# Patient Record
Sex: MALE | Race: BLACK OR AFRICAN AMERICAN | NOT HISPANIC OR LATINO | URBAN - METROPOLITAN AREA
[De-identification: names, ages, dates, MRNs, and addresses within clinical notes are randomized per-mention and may not be internally consistent; named-entity substitution may affect disease eponyms.]

---

## 2022-07-19 ENCOUNTER — INPATIENT (INPATIENT)
Facility: HOSPITAL | Age: 27
LOS: 2 days | Discharge: HOME | End: 2022-07-22
Attending: STUDENT IN AN ORGANIZED HEALTH CARE EDUCATION/TRAINING PROGRAM | Admitting: STUDENT IN AN ORGANIZED HEALTH CARE EDUCATION/TRAINING PROGRAM

## 2022-07-19 VITALS
RESPIRATION RATE: 16 BRPM | HEART RATE: 54 BPM | TEMPERATURE: 98 F | OXYGEN SATURATION: 100 % | DIASTOLIC BLOOD PRESSURE: 102 MMHG | SYSTOLIC BLOOD PRESSURE: 149 MMHG

## 2022-07-19 DIAGNOSIS — Z00.00 ENCOUNTER FOR GENERAL ADULT MEDICAL EXAMINATION WITHOUT ABNORMAL FINDINGS: ICD-10-CM

## 2022-07-19 DIAGNOSIS — I10 ESSENTIAL (PRIMARY) HYPERTENSION: ICD-10-CM

## 2022-07-19 DIAGNOSIS — E11.10 TYPE 2 DIABETES MELLITUS WITH KETOACIDOSIS WITHOUT COMA: ICD-10-CM

## 2022-07-19 DIAGNOSIS — E11.65 TYPE 2 DIABETES MELLITUS WITH HYPERGLYCEMIA: ICD-10-CM

## 2022-07-19 DIAGNOSIS — E11.9 TYPE 2 DIABETES MELLITUS WITHOUT COMPLICATIONS: ICD-10-CM

## 2022-07-19 DIAGNOSIS — K76.0 FATTY (CHANGE OF) LIVER, NOT ELSEWHERE CLASSIFIED: ICD-10-CM

## 2022-07-19 DIAGNOSIS — E87.1 HYPO-OSMOLALITY AND HYPONATREMIA: ICD-10-CM

## 2022-07-19 LAB
A1C WITH ESTIMATED AVERAGE GLUCOSE RESULT: 12.8 % — HIGH (ref 4–5.6)
ALBUMIN SERPL ELPH-MCNC: 4.8 G/DL — SIGNIFICANT CHANGE UP (ref 3.3–5)
ALP SERPL-CCNC: 83 U/L — SIGNIFICANT CHANGE UP (ref 40–120)
ALT FLD-CCNC: 79 U/L — HIGH (ref 4–41)
ANION GAP SERPL CALC-SCNC: 15 MMOL/L — HIGH (ref 7–14)
ANION GAP SERPL CALC-SCNC: 15 MMOL/L — HIGH (ref 7–14)
ANION GAP SERPL CALC-SCNC: 19 MMOL/L — HIGH (ref 7–14)
APPEARANCE UR: CLEAR — SIGNIFICANT CHANGE UP
AST SERPL-CCNC: 43 U/L — HIGH (ref 4–40)
B-OH-BUTYR SERPL-SCNC: 0.7 MMOL/L — HIGH (ref 0–0.4)
B-OH-BUTYR SERPL-SCNC: 1.1 MMOL/L — HIGH (ref 0–0.4)
BASOPHILS # BLD AUTO: 0.02 K/UL — SIGNIFICANT CHANGE UP (ref 0–0.2)
BASOPHILS NFR BLD AUTO: 0.4 % — SIGNIFICANT CHANGE UP (ref 0–2)
BILIRUB SERPL-MCNC: 1 MG/DL — SIGNIFICANT CHANGE UP (ref 0.2–1.2)
BILIRUB UR-MCNC: NEGATIVE — SIGNIFICANT CHANGE UP
BLOOD GAS VENOUS COMPREHENSIVE RESULT: SIGNIFICANT CHANGE UP
BLOOD GAS VENOUS COMPREHENSIVE RESULT: SIGNIFICANT CHANGE UP
BUN SERPL-MCNC: 13 MG/DL — SIGNIFICANT CHANGE UP (ref 7–23)
BUN SERPL-MCNC: 14 MG/DL — SIGNIFICANT CHANGE UP (ref 7–23)
BUN SERPL-MCNC: 15 MG/DL — SIGNIFICANT CHANGE UP (ref 7–23)
CALCIUM SERPL-MCNC: 10.3 MG/DL — SIGNIFICANT CHANGE UP (ref 8.4–10.5)
CALCIUM SERPL-MCNC: 9.8 MG/DL — SIGNIFICANT CHANGE UP (ref 8.4–10.5)
CALCIUM SERPL-MCNC: 9.9 MG/DL — SIGNIFICANT CHANGE UP (ref 8.4–10.5)
CHLORIDE SERPL-SCNC: 86 MMOL/L — LOW (ref 98–107)
CHLORIDE SERPL-SCNC: 92 MMOL/L — LOW (ref 98–107)
CHLORIDE SERPL-SCNC: 96 MMOL/L — LOW (ref 98–107)
CO2 SERPL-SCNC: 23 MMOL/L — SIGNIFICANT CHANGE UP (ref 22–31)
CO2 SERPL-SCNC: 25 MMOL/L — SIGNIFICANT CHANGE UP (ref 22–31)
CO2 SERPL-SCNC: 26 MMOL/L — SIGNIFICANT CHANGE UP (ref 22–31)
COLOR SPEC: SIGNIFICANT CHANGE UP
CREAT SERPL-MCNC: 1.13 MG/DL — SIGNIFICANT CHANGE UP (ref 0.5–1.3)
CREAT SERPL-MCNC: 1.17 MG/DL — SIGNIFICANT CHANGE UP (ref 0.5–1.3)
CREAT SERPL-MCNC: 1.35 MG/DL — HIGH (ref 0.5–1.3)
DIFF PNL FLD: NEGATIVE — SIGNIFICANT CHANGE UP
EGFR: 74 ML/MIN/1.73M2 — SIGNIFICANT CHANGE UP
EGFR: 88 ML/MIN/1.73M2 — SIGNIFICANT CHANGE UP
EGFR: 91 ML/MIN/1.73M2 — SIGNIFICANT CHANGE UP
EOSINOPHIL # BLD AUTO: 0.15 K/UL — SIGNIFICANT CHANGE UP (ref 0–0.5)
EOSINOPHIL NFR BLD AUTO: 2.9 % — SIGNIFICANT CHANGE UP (ref 0–6)
EPI CELLS # UR: 0 /HPF — SIGNIFICANT CHANGE UP (ref 0–5)
ESTIMATED AVERAGE GLUCOSE: 321 — SIGNIFICANT CHANGE UP
FLUAV AG NPH QL: SIGNIFICANT CHANGE UP
FLUBV AG NPH QL: SIGNIFICANT CHANGE UP
GAS PNL BLDV: SIGNIFICANT CHANGE UP
GLUCOSE SERPL-MCNC: 356 MG/DL — HIGH (ref 70–99)
GLUCOSE SERPL-MCNC: 489 MG/DL — CRITICAL HIGH (ref 70–99)
GLUCOSE SERPL-MCNC: 720 MG/DL — CRITICAL HIGH (ref 70–99)
GLUCOSE UR QL: ABNORMAL
HCT VFR BLD CALC: 46 % — SIGNIFICANT CHANGE UP (ref 39–50)
HGB BLD-MCNC: 16.3 G/DL — SIGNIFICANT CHANGE UP (ref 13–17)
IANC: 2.93 K/UL — SIGNIFICANT CHANGE UP (ref 1.8–7.4)
IMM GRANULOCYTES NFR BLD AUTO: 0.2 % — SIGNIFICANT CHANGE UP (ref 0–1.5)
KETONES UR-MCNC: ABNORMAL
LACTATE BLDV-MCNC: 2 MMOL/L — SIGNIFICANT CHANGE UP (ref 0.5–2)
LEUKOCYTE ESTERASE UR-ACNC: NEGATIVE — SIGNIFICANT CHANGE UP
LIDOCAIN IGE QN: 26 U/L — SIGNIFICANT CHANGE UP (ref 7–60)
LYMPHOCYTES # BLD AUTO: 1.52 K/UL — SIGNIFICANT CHANGE UP (ref 1–3.3)
LYMPHOCYTES # BLD AUTO: 29.3 % — SIGNIFICANT CHANGE UP (ref 13–44)
MAGNESIUM SERPL-MCNC: 2.4 MG/DL — SIGNIFICANT CHANGE UP (ref 1.6–2.6)
MAGNESIUM SERPL-MCNC: 2.5 MG/DL — SIGNIFICANT CHANGE UP (ref 1.6–2.6)
MCHC RBC-ENTMCNC: 28.8 PG — SIGNIFICANT CHANGE UP (ref 27–34)
MCHC RBC-ENTMCNC: 35.4 GM/DL — SIGNIFICANT CHANGE UP (ref 32–36)
MCV RBC AUTO: 81.4 FL — SIGNIFICANT CHANGE UP (ref 80–100)
MONOCYTES # BLD AUTO: 0.55 K/UL — SIGNIFICANT CHANGE UP (ref 0–0.9)
MONOCYTES NFR BLD AUTO: 10.6 % — SIGNIFICANT CHANGE UP (ref 2–14)
NEUTROPHILS # BLD AUTO: 2.93 K/UL — SIGNIFICANT CHANGE UP (ref 1.8–7.4)
NEUTROPHILS NFR BLD AUTO: 56.6 % — SIGNIFICANT CHANGE UP (ref 43–77)
NITRITE UR-MCNC: NEGATIVE — SIGNIFICANT CHANGE UP
NRBC # BLD: 0 /100 WBCS — SIGNIFICANT CHANGE UP
NRBC # FLD: 0 K/UL — SIGNIFICANT CHANGE UP
PH UR: 6 — SIGNIFICANT CHANGE UP (ref 5–8)
PHOSPHATE SERPL-MCNC: 4.1 MG/DL — SIGNIFICANT CHANGE UP (ref 2.5–4.5)
PHOSPHATE SERPL-MCNC: 4.6 MG/DL — HIGH (ref 2.5–4.5)
PLATELET # BLD AUTO: 336 K/UL — SIGNIFICANT CHANGE UP (ref 150–400)
POTASSIUM SERPL-MCNC: 4.1 MMOL/L — SIGNIFICANT CHANGE UP (ref 3.5–5.3)
POTASSIUM SERPL-MCNC: 4.5 MMOL/L — SIGNIFICANT CHANGE UP (ref 3.5–5.3)
POTASSIUM SERPL-MCNC: 5.2 MMOL/L — SIGNIFICANT CHANGE UP (ref 3.5–5.3)
POTASSIUM SERPL-SCNC: 4.1 MMOL/L — SIGNIFICANT CHANGE UP (ref 3.5–5.3)
POTASSIUM SERPL-SCNC: 4.5 MMOL/L — SIGNIFICANT CHANGE UP (ref 3.5–5.3)
POTASSIUM SERPL-SCNC: 5.2 MMOL/L — SIGNIFICANT CHANGE UP (ref 3.5–5.3)
PROT SERPL-MCNC: 8.7 G/DL — HIGH (ref 6–8.3)
PROT UR-MCNC: NEGATIVE — SIGNIFICANT CHANGE UP
RBC # BLD: 5.65 M/UL — SIGNIFICANT CHANGE UP (ref 4.2–5.8)
RBC # FLD: 13 % — SIGNIFICANT CHANGE UP (ref 10.3–14.5)
RBC CASTS # UR COMP ASSIST: 0 /HPF — SIGNIFICANT CHANGE UP (ref 0–4)
RSV RNA NPH QL NAA+NON-PROBE: SIGNIFICANT CHANGE UP
SARS-COV-2 RNA SPEC QL NAA+PROBE: SIGNIFICANT CHANGE UP
SODIUM SERPL-SCNC: 128 MMOL/L — LOW (ref 135–145)
SODIUM SERPL-SCNC: 132 MMOL/L — LOW (ref 135–145)
SODIUM SERPL-SCNC: 137 MMOL/L — SIGNIFICANT CHANGE UP (ref 135–145)
SP GR SPEC: 1.03 — SIGNIFICANT CHANGE UP (ref 1–1.05)
TROPONIN T, HIGH SENSITIVITY RESULT: 14 NG/L — SIGNIFICANT CHANGE UP
UROBILINOGEN FLD QL: SIGNIFICANT CHANGE UP
WBC # BLD: 5.18 K/UL — SIGNIFICANT CHANGE UP (ref 3.8–10.5)
WBC # FLD AUTO: 5.18 K/UL — SIGNIFICANT CHANGE UP (ref 3.8–10.5)
WBC UR QL: 1 /HPF — SIGNIFICANT CHANGE UP (ref 0–5)

## 2022-07-19 PROCEDURE — 99285 EMERGENCY DEPT VISIT HI MDM: CPT

## 2022-07-19 PROCEDURE — 76705 ECHO EXAM OF ABDOMEN: CPT | Mod: 26

## 2022-07-19 PROCEDURE — 99255 IP/OBS CONSLTJ NEW/EST HI 80: CPT

## 2022-07-19 PROCEDURE — 74177 CT ABD & PELVIS W/CONTRAST: CPT | Mod: 26,MA

## 2022-07-19 PROCEDURE — 99223 1ST HOSP IP/OBS HIGH 75: CPT | Mod: GC

## 2022-07-19 RX ORDER — SODIUM CHLORIDE 9 MG/ML
1000 INJECTION, SOLUTION INTRAVENOUS ONCE
Refills: 0 | Status: COMPLETED | OUTPATIENT
Start: 2022-07-19 | End: 2022-07-19

## 2022-07-19 RX ORDER — METOCLOPRAMIDE HCL 10 MG
10 TABLET ORAL ONCE
Refills: 0 | Status: COMPLETED | OUTPATIENT
Start: 2022-07-19 | End: 2022-07-19

## 2022-07-19 RX ORDER — DEXTROSE 50 % IN WATER 50 %
12.5 SYRINGE (ML) INTRAVENOUS ONCE
Refills: 0 | Status: DISCONTINUED | OUTPATIENT
Start: 2022-07-19 | End: 2022-07-22

## 2022-07-19 RX ORDER — INSULIN LISPRO 100/ML
4 VIAL (ML) SUBCUTANEOUS ONCE
Refills: 0 | Status: COMPLETED | OUTPATIENT
Start: 2022-07-19 | End: 2022-07-19

## 2022-07-19 RX ORDER — DEXTROSE 50 % IN WATER 50 %
25 SYRINGE (ML) INTRAVENOUS ONCE
Refills: 0 | Status: DISCONTINUED | OUTPATIENT
Start: 2022-07-19 | End: 2022-07-22

## 2022-07-19 RX ORDER — SODIUM CHLORIDE 9 MG/ML
1000 INJECTION, SOLUTION INTRAVENOUS
Refills: 0 | Status: DISCONTINUED | OUTPATIENT
Start: 2022-07-19 | End: 2022-07-22

## 2022-07-19 RX ORDER — INSULIN GLARGINE 100 [IU]/ML
23 INJECTION, SOLUTION SUBCUTANEOUS
Refills: 0 | Status: DISCONTINUED | OUTPATIENT
Start: 2022-07-20 | End: 2022-07-20

## 2022-07-19 RX ORDER — FAMOTIDINE 10 MG/ML
20 INJECTION INTRAVENOUS ONCE
Refills: 0 | Status: COMPLETED | OUTPATIENT
Start: 2022-07-19 | End: 2022-07-19

## 2022-07-19 RX ORDER — OMEPRAZOLE 10 MG/1
1 CAPSULE, DELAYED RELEASE ORAL
Qty: 0 | Refills: 0 | DISCHARGE

## 2022-07-19 RX ORDER — PANTOPRAZOLE SODIUM 20 MG/1
40 TABLET, DELAYED RELEASE ORAL
Refills: 0 | Status: DISCONTINUED | OUTPATIENT
Start: 2022-07-19 | End: 2022-07-22

## 2022-07-19 RX ORDER — INSULIN LISPRO 100/ML
VIAL (ML) SUBCUTANEOUS EVERY 4 HOURS
Refills: 0 | Status: DISCONTINUED | OUTPATIENT
Start: 2022-07-19 | End: 2022-07-20

## 2022-07-19 RX ORDER — GLUCAGON INJECTION, SOLUTION 0.5 MG/.1ML
1 INJECTION, SOLUTION SUBCUTANEOUS ONCE
Refills: 0 | Status: DISCONTINUED | OUTPATIENT
Start: 2022-07-19 | End: 2022-07-22

## 2022-07-19 RX ORDER — SODIUM CHLORIDE 9 MG/ML
1000 INJECTION, SOLUTION INTRAVENOUS
Refills: 0 | Status: DISCONTINUED | OUTPATIENT
Start: 2022-07-19 | End: 2022-07-20

## 2022-07-19 RX ORDER — INSULIN LISPRO 100/ML
6 VIAL (ML) SUBCUTANEOUS ONCE
Refills: 0 | Status: COMPLETED | OUTPATIENT
Start: 2022-07-19 | End: 2022-07-19

## 2022-07-19 RX ORDER — ACETAMINOPHEN 500 MG
1000 TABLET ORAL ONCE
Refills: 0 | Status: COMPLETED | OUTPATIENT
Start: 2022-07-19 | End: 2022-07-19

## 2022-07-19 RX ORDER — INSULIN GLARGINE 100 [IU]/ML
23 INJECTION, SOLUTION SUBCUTANEOUS ONCE
Refills: 0 | Status: COMPLETED | OUTPATIENT
Start: 2022-07-19 | End: 2022-07-19

## 2022-07-19 RX ORDER — DEXTROSE 50 % IN WATER 50 %
15 SYRINGE (ML) INTRAVENOUS ONCE
Refills: 0 | Status: DISCONTINUED | OUTPATIENT
Start: 2022-07-19 | End: 2022-07-22

## 2022-07-19 RX ADMIN — INSULIN GLARGINE 23 UNIT(S): 100 INJECTION, SOLUTION SUBCUTANEOUS at 13:16

## 2022-07-19 RX ADMIN — Medication 6: at 22:27

## 2022-07-19 RX ADMIN — Medication 30 MILLILITER(S): at 09:17

## 2022-07-19 RX ADMIN — SODIUM CHLORIDE 1000 MILLILITER(S): 9 INJECTION, SOLUTION INTRAVENOUS at 09:16

## 2022-07-19 RX ADMIN — Medication 6 UNIT(S): at 11:39

## 2022-07-19 RX ADMIN — Medication 8: at 18:45

## 2022-07-19 RX ADMIN — SODIUM CHLORIDE 125 MILLILITER(S): 9 INJECTION, SOLUTION INTRAVENOUS at 15:04

## 2022-07-19 RX ADMIN — Medication 4 UNIT(S): at 16:12

## 2022-07-19 RX ADMIN — SODIUM CHLORIDE 1000 MILLILITER(S): 9 INJECTION, SOLUTION INTRAVENOUS at 18:07

## 2022-07-19 RX ADMIN — FAMOTIDINE 20 MILLIGRAM(S): 10 INJECTION INTRAVENOUS at 09:17

## 2022-07-19 RX ADMIN — Medication 400 MILLIGRAM(S): at 09:17

## 2022-07-19 RX ADMIN — Medication 10 MILLIGRAM(S): at 09:16

## 2022-07-19 RX ADMIN — Medication 4 UNIT(S): at 15:04

## 2022-07-19 NOTE — ED ADULT NURSE REASSESSMENT NOTE - NS ED NURSE REASSESS COMMENT FT1
AAOx4, no signs of distress, continuing q1h fingersticks and insulin coverage, pending bed placement, fall precautions maintained

## 2022-07-19 NOTE — CONSULT NOTE ADULT - SUBJECTIVE AND OBJECTIVE BOX
HPI:      PAST MEDICAL & SURGICAL HISTORY:      FAMILY HISTORY:      Social History:        MEDICATIONS  (STANDING):  lactated ringers. 1000 milliLiter(s) (125 mL/Hr) IV Continuous <Continuous>    MEDICATIONS  (PRN):      Allergies    No Known Allergies    Intolerances      Review of Systems:  Constitutional: No fever  Eyes: No blurry vision  Neuro: No tremors  HEENT: No pain  Cardiovascular: No chest pain, palpitations  Respiratory: No SOB, no cough  GI: No nausea, vomiting, abdominal pain  : No dysuria  Skin: no rash  Psych: no depression  Endocrine: no polyuria, polydipsia  Hem/lymph: no swelling  Osteoporosis: no fractures    ALL OTHER SYSTEMS REVIEWED AND NEGATIVE    UNABLE TO OBTAIN    PHYSICAL EXAM:  VITALS: T(C): 36.6 (07-19-22 @ 16:00)  T(F): 97.9 (07-19-22 @ 16:00), Max: 97.9 (07-19-22 @ 16:00)  HR: 96 (07-19-22 @ 16:00) (54 - 96)  BP: 154/106 (07-19-22 @ 16:00) (149/102 - 154/106)  RR:  (14 - 16)  SpO2:  (98% - 100%)  Wt(kg): --  GENERAL: NAD, well-groomed, well-developed  EYES: No proptosis, no lid lag, anicteric  HEENT:  Atraumatic, Normocephalic, moist mucous membranes  THYROID: Normal size, no palpable nodules  RESPIRATORY: Clear to auscultation bilaterally; No rales, rhonchi, wheezing  CARDIOVASCULAR: Regular rate and rhythm; No murmurs; no peripheral edema  GI: Soft, nontender, non distended, normal bowel sounds  SKIN: Dry, intact, No rashes or lesions  MUSCULOSKELETAL: Full range of motion, normal strength  NEURO: sensation intact, extraocular movements intact, no tremor  PSYCH: Alert and oriented x 3, normal affect, normal mood  CUSHING'S SIGNS: no striae      CAPILLARY BLOOD GLUCOSE      POCT Blood Glucose.: 368 mg/dL (19 Jul 2022 17:08)  POCT Blood Glucose.: 389 mg/dL (19 Jul 2022 15:54)  POCT Blood Glucose.: 447 mg/dL (19 Jul 2022 14:22)  POCT Blood Glucose.: 439 mg/dL (19 Jul 2022 13:15)  POCT Blood Glucose.: 473 mg/dL (19 Jul 2022 12:33)  POCT Blood Glucose.: 512 mg/dL (19 Jul 2022 11:23)                            16.3   5.18  )-----------( 336      ( 19 Jul 2022 09:23 )             46.0       07-19    132<L>  |  92<L>  |  14  ----------------------------<  489<HH>  4.5   |  25  |  1.17    eGFR: 88    Ca    9.8      07-19  Mg     2.50     07-19  Phos  4.1     07-19    TPro  8.7<H>  /  Alb  4.8  /  TBili  1.0  /  DBili  x   /  AST  43<H>  /  ALT  79<H>  /  AlkPhos  83  07-19      Thyroid Function Tests:      A1C with Estimated Average Glucose Result: 12.8 % (07-19-22 @ 12:33)          Radiology:                HPI: 27M presents to ED with nausea, vomiting, abdominal pain reduced po intake x 2 weeks, found with glucose over 700, elevated anion gap to 19.  He also noted polyuria, polydipsia, mild blurry vision.  Mother and grandmother have DM2. He was never told of diabetes before, had not been following closely with a doctor.  Reports diet was poor high in juice, soda, carbs. + weight loss    PAST MEDICAL & SURGICAL HISTORY:    FAMILY HISTORY: Mother and grandmother DM2    Social History: he is uninsured    MEDICATIONS  (STANDING):  lactated ringers. 1000 milliLiter(s) (125 mL/Hr) IV Continuous <Continuous>    MEDICATIONS  (PRN):      Allergies    No Known Allergies    Intolerances      Review of Systems:  Constitutional: No fever  Eyes: +blurry vision  Neuro: No tremors  HEENT: No pain  Cardiovascular: No chest pain, palpitations  Respiratory: No SOB, no cough  GI: +nausea, vomiting, abdominal pain  : No dysuria  Skin: no rash  Psych: no depression  Endocrine: + polyuria, polydipsia  Hem/lymph: no swelling  Osteoporosis: no fractures    ALL OTHER SYSTEMS REVIEWED AND NEGATIVE      PHYSICAL EXAM:  VITALS: T(C): 36.6 (07-19-22 @ 16:00)  T(F): 97.9 (07-19-22 @ 16:00), Max: 97.9 (07-19-22 @ 16:00)  HR: 96 (07-19-22 @ 16:00) (54 - 96)  BP: 154/106 (07-19-22 @ 16:00) (149/102 - 154/106)  RR:  (14 - 16)  SpO2:  (98% - 100%)  Wt(kg): --  GENERAL: NAD, well-groomed, well-developed  EYES: No proptosis, no lid lag, anicteric  HEENT:  Atraumatic, Normocephalic, moist mucous membranes  THYROID: Normal size, no palpable nodules  RESPIRATORY: Clear to auscultation bilaterally; No rales, rhonchi, wheezing  CARDIOVASCULAR: Regular rate and rhythm; No murmurs; no peripheral edema  GI: Soft, nontender, non distended, normal bowel sounds  SKIN: Dry, intact, No rashes or lesions  MUSCULOSKELETAL: Full range of motion, normal strength  NEURO: sensation intact, extraocular movements intact, no tremor  PSYCH: Alert and oriented x 3, normal affect, normal mood  CUSHING'S SIGNS: no striae      CAPILLARY BLOOD GLUCOSE      POCT Blood Glucose.: 368 mg/dL (19 Jul 2022 17:08)  POCT Blood Glucose.: 389 mg/dL (19 Jul 2022 15:54)  POCT Blood Glucose.: 447 mg/dL (19 Jul 2022 14:22)  POCT Blood Glucose.: 439 mg/dL (19 Jul 2022 13:15)  POCT Blood Glucose.: 473 mg/dL (19 Jul 2022 12:33)  POCT Blood Glucose.: 512 mg/dL (19 Jul 2022 11:23)                            16.3   5.18  )-----------( 336      ( 19 Jul 2022 09:23 )             46.0       07-19    132<L>  |  92<L>  |  14  ----------------------------<  489<HH>  4.5   |  25  |  1.17    eGFR: 88    Ca    9.8      07-19  Mg     2.50     07-19  Phos  4.1     07-19    TPro  8.7<H>  /  Alb  4.8  /  TBili  1.0  /  DBili  x   /  AST  43<H>  /  ALT  79<H>  /  AlkPhos  83  07-19      Thyroid Function Tests:      A1C with Estimated Average Glucose Result: 12.8 % (07-19-22 @ 12:33)          Radiology:                HPI: 27M presents to ED with nausea, vomiting, abdominal pain reduced po intake x 2 weeks, found with glucose over 700, elevated anion gap to 19.  He also noted polyuria, polydipsia, mild blurry vision.  Mother and grandmother have DM2. He was never told of diabetes before, had not been following closely with a doctor.  Reports diet was poor high in juice, soda, carbs. + weight loss    PAST MEDICAL & SURGICAL HISTORY:    FAMILY HISTORY: Mother and grandmother DM2    Social History: he is uninsured    MEDICATIONS  (STANDING):  lactated ringers. 1000 milliLiter(s) (125 mL/Hr) IV Continuous <Continuous>    MEDICATIONS  (PRN):      Allergies    No Known Allergies    Intolerances      Review of Systems:  Constitutional: No fever  Eyes: +blurry vision  Neuro: No tremors  HEENT: No pain  Cardiovascular: No chest pain, palpitations  Respiratory: No SOB, no cough  GI: +nausea, vomiting, abdominal pain  : No dysuria  Skin: no rash  Psych: no depression  Endocrine: + polyuria, polydipsia  Hem/lymph: no swelling  Osteoporosis: no fractures    ALL OTHER SYSTEMS REVIEWED AND NEGATIVE      PHYSICAL EXAM:  VITALS: T(C): 36.6 (07-19-22 @ 16:00)  T(F): 97.9 (07-19-22 @ 16:00), Max: 97.9 (07-19-22 @ 16:00)  HR: 96 (07-19-22 @ 16:00) (54 - 96)  BP: 154/106 (07-19-22 @ 16:00) (149/102 - 154/106)  RR:  (14 - 16)  SpO2:  (98% - 100%)  Wt(kg): --  GENERAL: NAD, well-groomed, well-developed  EYES: No proptosis, no lid lag, anicteric  HEENT:  Atraumatic, Normocephalic, moist mucous membranes  THYROID: + thyromegaly, nontender  RESPIRATORY: Clear to auscultation bilaterally; No rales, rhonchi, wheezing  CARDIOVASCULAR: Regular rate and rhythm; No murmurs; no peripheral edema  GI: Soft, nontender, non distended, normal bowel sounds  SKIN: Dry, intact, No rashes or lesions  MUSCULOSKELETAL: Full range of motion, normal strength  NEURO: sensation intact, extraocular movements intact, no tremor  PSYCH: Alert and oriented x 3, normal affect, normal mood  CUSHING'S SIGNS: no striae      CAPILLARY BLOOD GLUCOSE      POCT Blood Glucose.: 368 mg/dL (19 Jul 2022 17:08)  POCT Blood Glucose.: 389 mg/dL (19 Jul 2022 15:54)  POCT Blood Glucose.: 447 mg/dL (19 Jul 2022 14:22)  POCT Blood Glucose.: 439 mg/dL (19 Jul 2022 13:15)  POCT Blood Glucose.: 473 mg/dL (19 Jul 2022 12:33)  POCT Blood Glucose.: 512 mg/dL (19 Jul 2022 11:23)                            16.3   5.18  )-----------( 336      ( 19 Jul 2022 09:23 )             46.0       07-19    132<L>  |  92<L>  |  14  ----------------------------<  489<HH>  4.5   |  25  |  1.17    eGFR: 88    Ca    9.8      07-19  Mg     2.50     07-19  Phos  4.1     07-19    TPro  8.7<H>  /  Alb  4.8  /  TBili  1.0  /  DBili  x   /  AST  43<H>  /  ALT  79<H>  /  AlkPhos  83  07-19      Thyroid Function Tests:      A1C with Estimated Average Glucose Result: 12.8 % (07-19-22 @ 12:33)          Radiology:

## 2022-07-19 NOTE — ED PROVIDER NOTE - NSICDXFAMILYHX_GEN_ALL_CORE_FT
FAMILY HISTORY:  Father  Still living? Yes, Estimated age: Age Unknown  Family history of other disorder of lipoprotein metabolism or other lipidemia, Age at diagnosis: Age Unknown    Mother  Still living? Yes, Estimated age: Age Unknown  Family history of diabetes mellitus (DM), Age at diagnosis: Age Unknown    Aunt  Still living? Yes, Estimated age: Age Unknown  Family history of diabetes mellitus (DM), Age at diagnosis: Age Unknown    Uncle  Still living? No  Family history of diabetes mellitus (DM), Age at diagnosis: Age Unknown

## 2022-07-19 NOTE — H&P ADULT - PROBLEM SELECTOR PLAN 2
- Newly diagnosed with A1C 12.8 and serum glucose 720, beta hydroxy mildly elevated to 1.2, serum lactate mildly elevated to 3.   - Etiology: multifactorial with strong family history of DM and HLD and heavy use of juice and soda since childhood. Have not seen PCP due to insurance issue since age of 17.   - IL IVF x1 given at the ED. Another IVF given on the floor due to hypovolemia from NPO and polyuria.  - Lantus 23 units given at the ED and started on moderate insulin sliding scale and FS Q4, will continue Lantus 23 units daily  - Will monitor his BMP, VBG, lactate, and beta hydroxy frequently  - Adjust insulin regimen based on labs - Newly diagnosed with A1C 12.8 and serum glucose 720, beta hydroxy mildly elevated to 1.2, serum lactate mildly elevated to 3. Potassium 5.3.  - Etiology: multifactorial with strong family history of DM and HLD and heavy use of juice and soda since childhood. Have not seen PCP due to insurance issue since age of 17.   - IL IVF x1 given at the ED. Another IVF given on the floor due to hypovolemia from NPO and polyuria.  - Lantus 23 units given at the ED and started on moderate insulin sliding scale and FS Q4, will continue Lantus 23 units daily  - Will monitor his BMP, VBG, lactate, and beta hydroxy frequently  - Adjust insulin regimen based on labs  - Follow endo recs to look into type:      check MICHAEL, islet cell and zinc transporter ab     check c-peptide as outpatient when glucose toxicity is reduced - Newly diagnosed with A1C 12.8 and serum glucose 720, beta hydroxy mildly elevated to 1.2, serum lactate mildly elevated to 3. Potassium 5.3.  - Etiology: multifactorial with strong family history of DM and HLD and heavy use of juice and soda since childhood. Have not seen PCP due to insurance issue since age of 17.   - IL IVF x1 given at the ED. Another IVF given on the floor due to hypovolemia from NPO and polyuria.  - Lantus 23 units given at the ED and started on moderate insulin sliding scale and FS Q4, will continue Lantus 23 units daily  - Will monitor his BMP, VBG, lactate, and beta hydroxy frequently  - FS glucose steadily improving down to 300s.   - Adjust insulin regimen based on labs  - Follow endo recs to look into type:      check MICHAEL, islet cell and zinc transporter ab     check c-peptide as outpatient when glucose toxicity is reduced

## 2022-07-19 NOTE — H&P ADULT - NSHPLABSRESULTS_GEN_ALL_CORE
16.3   5.18  )-----------( 336      ( 19 Jul 2022 09:23 )             46.0   07-19    132<L>  |  92<L>  |  14  ----------------------------<  489<HH>  4.5   |  25  |  1.17    Ca    9.8      19 Jul 2022 12:33  Phos  4.1     07-19  Mg     2.50     07-19    TPro  8.7<H>  /  Alb  4.8  /  TBili  1.0  /  DBili  x   /  AST  43<H>  /  ALT  79<H>  /  AlkPhos  83  07-19    < from: US Abdomen Upper Quadrant Right (07.19.22 @ 10:24) >    ACC: [id number] EXAM:  US ABDOMEN RT UPR QUADRANT                          PROCEDURE DATE:  07/19/2022          INTERPRETATION:  CLINICAL INFORMATION: Epigastric and right upper   quadrant pain, vomiting    COMPARISON: None available.    TECHNIQUE: Sonography of the right upper quadrant.    FINDINGS:  Liver: Enlarged. Increased echogenicity with attenuation of the posterior   liver. Smooth surface contour.  Bile ducts: Normal caliber. Common bile duct measures 3 mm.  Gallbladder: Within normal limits. No wall thickening or pericholecystic   edema. No visualized gallstones.  Pancreas: Not visualized.  Right kidney: 11.7 cm. No hydronephrosis.  Ascites: None.  IVC: Visualized portions are within normal limits.    IMPRESSION:  Hepatomegaly with hepatic steatosis.    Sonographically normal gallbladder. 16.3   5.18  )-----------( 336      ( 19 Jul 2022 09:23 )             46.0   07-19    132<L>  |  92<L>  |  14  ----------------------------<  489<HH>  4.5   |  25  |  1.17    Ca    9.8      19 Jul 2022 12:33  Phos  4.1     07-19  Mg     2.50     07-19    TPro  8.7<H>  /  Alb  4.8  /  TBili  1.0  /  DBili  x   /  AST  43<H>  /  ALT  79<H>  /  AlkPhos  83  07-19    Beta Hydroxy-Butyrate (07.19.22 @ 13:02)    Beta Hydroxy-Butyrate: 1.1 mmol/L  Beta Hydroxy-Butyrate (07.19.22 @ 09:23)    Beta Hydroxy-Butyrate: 1.2 mmol/L    POCT  Blood Glucose (07.19.22 @ 18:45)    POCT Blood Glucose.: 341 mg/dL      < from: US Abdomen Upper Quadrant Right (07.19.22 @ 10:24) >    ACC: [id number] EXAM:  US ABDOMEN RT UPR QUADRANT                          PROCEDURE DATE:  07/19/2022          INTERPRETATION:  CLINICAL INFORMATION: Epigastric and right upper   quadrant pain, vomiting    COMPARISON: None available.    TECHNIQUE: Sonography of the right upper quadrant.    FINDINGS:  Liver: Enlarged. Increased echogenicity with attenuation of the posterior   liver. Smooth surface contour.  Bile ducts: Normal caliber. Common bile duct measures 3 mm.  Gallbladder: Within normal limits. No wall thickening or pericholecystic   edema. No visualized gallstones.  Pancreas: Not visualized.  Right kidney: 11.7 cm. No hydronephrosis.  Ascites: None.  IVC: Visualized portions are within normal limits.    IMPRESSION:  Hepatomegaly with hepatic steatosis.    Sonographically normal gallbladder.

## 2022-07-19 NOTE — H&P ADULT - PROBLEM SELECTOR PLAN 4
- Hepatomegaly with steatosis noted in the RUQ US  - Mild elevation of AST/ALT 43/79  <2 , normal ALK, Bilirubin  - Dx: fatty liver disease, alcohol use, medications  - Will trend lab and follow up in the outpatient

## 2022-07-19 NOTE — ED PROVIDER NOTE - PHYSICAL EXAMINATION
VS:  unremarkable    GEN - mild distress abd pain; malaise  A+O x3   HEAD - NC/AT     ENT - PEERL, EOMI, mucous membranes  dry , no discharge      NECK: Neck supple, non-tender without lymphadenopathy, no masses, no JVD  PULM - CTA b/l,  symmetric breath sounds  COR -  normal heart sounds    ABD - , ND, epig and RUQ abd ttp, RLQ ttp, soft,  BACK - no CVA tenderness, nontender spine     EXTREMS - no edema, no deformity, warm and well perfused    SKIN - no rash    or bruising      NEUROLOGIC - alert, face symmetric, speech fluent, sensation nl, motor no focal deficit.

## 2022-07-19 NOTE — H&P ADULT - HISTORY OF PRESENT ILLNESS
27 year old with no known past medical history presented to the ED for progressive worsening nausea and vomiting and poor oral intake for the past two weeks. Pt initially can not tolerate solid food and kept drinking juice for being thirsty. He endorsed also significant amount of urination and 30 pound weight loss over the past two weeks. In addition, he stated feeling abdominal pain on his right side in the past two days.  He denied any fever, chill, SOB, heart racing, and no sick contacts as far as he recollected. He stated that he has been heavy drinker of juice and soda (whole jars per day) since he was little kid. He has not seen PCP due to insurance issue since he was 17 and not aware of medical history. He reported that he has a strong family history of diabetes from his mother side and cholesterol issue from his father.  27 year old with no known past medical history presented to the ED for progressive worsening nausea and vomiting and poor oral intake for the past two weeks. Pt initially can not tolerate solid food and kept drinking juice for being thirsty. He endorsed also significant amount of urination and 30 pound weight loss over the past two weeks. In addition, he stated feeling abdominal pain on his right side in the past two days.  He denied any fever, chill, SOB, heart racing, and no sick contacts as far as he recollected. He stated that he has been heavy drinker of juice and soda (whole jars per day) since he was little kid. He has not seen PCP due to insurance issue since he was 17 and not aware of medical history. He reported that he has a strong family history of diabetes from his mother side and cholesterol issue from his father.     At the ED, he was given IVF 1L and lantus 23 units, amelog 10 units on a moderate insulin sliding scale (received 8 units). Vital sign stable, nausea/vomiting minimal.

## 2022-07-19 NOTE — H&P ADULT - ATTENDING COMMENTS
Patient seen and examined at bedside. In brief, 27 year old man with history of GERD, presenting with nausea, vomiting, and inability to tolerate PO for around two weeks.  Pt found to be hyperglycemic >700, with AG of 17, A1c of 12.8, consistent with HHS vs. DKA given AG, however, elevate FS, and low beta hydroxybuterate and new diabetes   #New DM A1c 12.8   - Started on weight based dosing Lantus 23 units at noon with moderate scale q 4hrs   - s/p IVF bolus, on maintaince fluids; add D5 to fluids once FS < 250s  - F/U lipid panel   - Needs insulin teaching on discharge   - Endocrine consult, appreciate recs   #APOLONIA - patient's creatine on admission was 1.35 likely pre-renal   - Continue IVF   - CTM   #GERD  - Continue PPI  #Elevated blood pressure  - If blood pressure remains elevated will start anti-hypertensive   Rest of plan as above

## 2022-07-19 NOTE — H&P ADULT - NSHPREVIEWOFSYSTEMS_GEN_ALL_CORE
REVIEW OF SYSTEMS:    CONSTITUTIONAL: No weakness, fevers or chills, obesity noted.  EYES: No visual changes; no sclera icterics, no pain or drainage  ENT:  No vertigo or throat pain   NECK: No pain or stiffness  RESPIRATORY: No cough, wheezing, hemoptysis; No shortness of breath  CARDIOVASCULAR: No chest pain or palpitations  GASTROINTESTINAL: + nausea, vomiting, or hematemesis; No diarrhea or constipation. No melena or hematochezia. Mild right lateral abdominal /flank pain.   GENITOURINARY: No dysuria, hematuria. increased urinary output.   NEUROLOGICAL: No numbness or weakness. No headache  SKIN: No itching,  Psych: No anxiety or depression

## 2022-07-19 NOTE — ED PROVIDER NOTE - PROGRESS NOTE DETAILS
DD ED ATTG:  feeling a bit better.  Noted to have glucose> 700 on serum, AG 19, pH 7.33.  Mild DKA.  VS acceptable.  Will try to close gap with SQ insulin, fluids, recheck BMP and VBG, will also send a1c.  Pt reports DM runs in his family, has been drinking a lot of juice, has been having weight loss. Still awaiting CT.

## 2022-07-19 NOTE — H&P ADULT - NSHPPHYSICALEXAM_GEN_ALL_CORE
VITALS:   T(C): 36.6 (07-19-22 @ 16:00), Max: 36.6 (07-19-22 @ 08:27)  HR: 96 (07-19-22 @ 16:00) (54 - 96)  BP: 154/106 (07-19-22 @ 16:00) (149/102 - 154/106)  RR: 14 (07-19-22 @ 16:00) (14 - 16)  SpO2: 98% (07-19-22 @ 16:00) (98% - 100%)    GENERAL: NAD, sitting on bed comfortably, obese  HEAD:  Atraumatic, Normocephalic  EYES: EOMI, PERRLA, conjunctiva and sclera clear  ENT: dry mucous membranes  NECK: Supple, No JVD  CHEST/LUNG: CTABL; No rales, rhonchi, wheezing, or rubs. Unlabored respirations  HEART: RRR. No M/R/G  ABDOMEN: Soft, normoactive BS. No hepatomegaly. Mild tenderness to deep palpation in his right lateral abdomen.   EXTREMITIES:  2+ Peripheral Pulses, brisk capillary refill. No clubbing, cyanosis, or edema  NERVOUS SYSTEM:  Alert & Oriented X3, speech clear. No deficits, CN II-XII intact. Normal sensation   MSK: FROM all 4 extremities, full and equal strength  PSYCH: Normal affect, normal speech, normal behavior  SKIN: Acanthosis nigricans noted on his neck .

## 2022-07-19 NOTE — CONSULT NOTE ADULT - ASSESSMENT
27M new onset DM presenting with severe hyperglycemia to 700s, mild DKA    1) DKA  anion gap 19, then improved to 15  BHB 1.2 then 1.1  ph 7.34  Glucose 700s initially now 300s  Discussed with ED attending earlier - Lantus 23 units recommended, given at 1:16PM  At time of visit patient reports symptomatic improvement  NPO  Moderate Admelog scale q4h, FS q4h  IV fluids, once glucose < 250 add dextrose to IV fluids until gap is closed and patient is eating  DKA labs q6h until gap resolved     2) New DM - ketosis prone DM2 vs rule out DM1  clarify type of DM  check MICHAEL, islet cell and zinc transporter ab  check c-peptide as outpatient when glucose toxicity is reduced  HBA1c 12.8%  For now maintain Lantus 23 units q daily at 1pm, will reassess trend to determine if Lantus dose should be changed by mid day tomorrow.  Once DKA resolved and on a carb consistent diet can recommend Admelog 6 units premeal TID, low Admelog scale premeal and low bedtime scale    Discharge plan:  Reviewed importance of dietary modifications  order RD consult as well  uninsured  will need glucometer teaching, insulin admin teaching either vial and/or pen depending on final dc plan  discussed with transitions of care pharmacist  may consider using coupons for short term supply of basal bolus insulin vs. Novolin 70/30 either vial from [employer] or pens from [employer].  To discuss further with patient tomorrow.  [place] [facility] [place] [address] [facility], [phone]    3) HTN  BP goal < 130/80  elevated on admission  would trend and consider need for therapy    Complex patient high level decision making.    Addison Gabriel MD  Division of Endocrinology  Pager: [phone]    If after 6PM or before 9AM, or on weekends/holidays, please call endocrine answering service for assistance ([phone]).  For nonurgent matters email [email or web address] for assistance.   27M new onset DM presenting with severe hyperglycemia to 700s, mild DKA    1) DKA  anion gap 19, then improved to 15  BHB 1.2 then 1.1  ph 7.34  Glucose 700s initially now 300s  Discussed with ED attending earlier - Lantus 23 units recommended, given at 1:16PM  At time of visit patient reports symptomatic improvement  NPO  Moderate Admelog scale q4h, FS q4h  IV fluids, once glucose < 250 add dextrose to IV fluids until gap is closed and patient is eating  DKA labs q6h until gap resolved     2) New DM - ketosis prone DM2 vs rule out DM1  clarify type of DM  check MICHAEL, islet cell and zinc transporter ab  check c-peptide as outpatient when glucose toxicity is reduced  HBA1c 12.8%  For now maintain Lantus 23 units q daily at 1pm, will reassess trend to determine if Lantus dose should be changed by mid day tomorrow.  Once DKA resolved and on a carb consistent diet can recommend Admelog 6 units premeal TID, low Admelog scale premeal and low bedtime scale    Discharge plan:  Reviewed importance of dietary modifications  order RD consult as well  uninsured  will need glucometer teaching, insulin admin teaching either vial and/or pen depending on final dc plan  discussed with transitions of care pharmacist  may consider using coupons for short term supply of basal bolus insulin vs. Novolin 70/30 either vial from [employer] or pens from [employer].  To discuss further with patient tomorrow.  [place] [facility] [place] [address] [facility], [phone]    3) HTN  BP goal < 130/80  elevated on admission  would trend and consider need for therapy    4) Thyromegaly, nontender  check TSH, Free T4 in AM    Complex patient high level decision making.    Addison Gabriel MD  Division of Endocrinology  Pager: [phone]    If after 6PM or before 9AM, or on weekends/holidays, please call endocrine answering service for assistance ([phone]).  For nonurgent matters email [email or web address] for assistance.

## 2022-07-19 NOTE — H&P ADULT - PROBLEM SELECTOR PLAN 1
Newly diagnosed with A1C 12.8 and serum glucose 720, beta hydroxy mildly elevated to 1.2, serum lactate mildly elevated to 3. Etiology: multifactorial with strong family history of DM and HLD and heavy use of juice and soda since childhood.  Have not seen PCP due to insurance issue since age of 17.   - IL IVF x1 given at the ED. Another IVF given on the floor due to hypovolemia from NPO and polyuria.  - Lantus 23 units given at the ED and started on moderate insulin sliding scale and FS Q4   - Will monitor - Serum glucose level initially presented at the level of 700s, > 500 mg/dl  - Increased effective plasma osmolality >320 mOsm/kg in the absence of ketoacidosis (minimal beta hydroxy)  - The - Serum glucose level initially presented at the level of 700s, > 500 mg/dl  - Increased effective plasma osmolality >320 mOsm/kg (321)in the absence of ketoacidosis (minimal beta hydroxy, normal PH although anion gap 19)  - The goal to treat HHS is to fluid resuscitation, electrolytes replenishment and glucose control   - Please refer insulin regimen and fluid/electrolytes resuscitation described below

## 2022-07-19 NOTE — ED PROVIDER NOTE - CLINICAL SUMMARY MEDICAL DECISION MAKING FREE TEXT BOX
27M not feeling well x 2 weeks,  not shawn PO, vomiting.  Also had nosebleed yesterday.  Today no vomiting.  Yesterday vomited x 1, day before x 1.  Hasn't sought medical care as of yet.  Tried sleeping and rest w/o improvement.  Having diffuse abd pain and low abd pain.  No pain at present, mainly p vomiting.  Not nauseous at present.  Never happened before.  Initially there was diarrhea but not anymore.  No abx use.  No travel.  GF has a cold.  PMHX GERD meds omeprazole  PSHX - none.  No T.  (+)MJ (not for last 4 weeks). NKA.  No dysuria, no blood in urine.  Epig, RUQ and RLQ ttp.  poss joseph vs appx vs colitis vs AGE, r/o covid, check labs CT, rx pain meds, fluids.  Pt will need pMD follow up if no acute finding.

## 2022-07-19 NOTE — ED PROVIDER NOTE - CARE PLAN
Principal Discharge DX:	Uncontrolled diabetes mellitus with hyperglycemia   1 Principal Discharge DX:	Uncontrolled diabetes mellitus with hyperglycemia  Secondary Diagnosis:	Frequent PVCs

## 2022-07-19 NOTE — ED ADULT NURSE NOTE - OBJECTIVE STATEMENT
Patient is a 28 yo male, h/x GERD, presenting with nausea, vomiting, abdominal and back pain x 2 weeks. AAOx4, no signs of distress, reports nausea and vomiting 1-3x/day after eating for the past 2 weeks, with abdominal and back pain after he vomits. Currently denying abdominal pain but abdomen is tender to right side, soft, nondistended. Denies chest pain, shortness of breath, fever, chills, urinary symptoms, constipation, diarrhea. 20G PIV placed to RAC, labs sent per orders. Pending CT/ultrasound. Fall precautions maintained.

## 2022-07-19 NOTE — H&P ADULT - PROBLEM SELECTOR PLAN 5
- Ordered TSH, lipid panel to look into metabolic syndrome   - NO DVT prophylaxis is indicated as pt is able to ambulate  - NPO now

## 2022-07-19 NOTE — ED ADULT NURSE REASSESSMENT NOTE - NS ED NURSE REASSESS COMMENT FT1
Patient returned to intake, AAOx4, no signs of distress, discussed high BG, insulin administered, pending repeat labs, fall precautions maintained

## 2022-07-19 NOTE — PATIENT PROFILE ADULT - FALL HARM RISK - UNIVERSAL INTERVENTIONS
Bed in lowest position, wheels locked, appropriate side rails in place/Call bell, personal items and telephone in reach/Instruct patient to call for assistance before getting out of bed or chair/Non-slip footwear when patient is out of bed/[place] to call system/Physically safe environment - no spills, clutter or unnecessary equipment/Purposeful Proactive Rounding/Room/bathroom lighting operational, light cord in reach

## 2022-07-19 NOTE — ED PROVIDER NOTE - OBJECTIVE STATEMENT
27M not feeling well x 2 weeks,  not shawn PO, vomiting.  Also had nosebleed yesterday.  Today no vomiting.  Yesterday vomited x 1, day before x 1.  Hasn't sought medical care as of yet.  Tried sleeping and rest w/o improvement.  Having diffuse abd pain and low abd pain.  No pain at present, mainly p vomiting.  Not nauseous at present.  Never happened before.  Initially there was diarrhea but not anymore.  No abx use.  No travel.  GF has a cold.  PMHX GERD meds omeprazole  PSHX - none.  No T.  (+)MJ (not for last 4 weeks). NKA.  No dysuria, no blood in urine.  Epig, RUQ and RLQ ttp.  poss joseph vs appx vs colitis vs AGE, r/o covid, check labs CT, rx pain meds, fluids.  Pt will need pMD follow up if no acute finding.    VS:  unremarkable    GEN - mild distress abd pain; malaise  A+O x3   HEAD - NC/AT     ENT - PEERL, EOMI, mucous membranes  dry , no discharge      NECK: Neck supple, non-tender without lymphadenopathy, no masses, no JVD  PULM - CTA b/l,  symmetric breath sounds  COR -  normal heart sounds    ABD - , ND, epig and RUQ abd ttp, RLQ ttp, soft,  BACK - no CVA tenderness, nontender spine     EXTREMS - no edema, no deformity, warm and well perfused    SKIN - no rash    or bruising      NEUROLOGIC - alert, face symmetric, speech fluent, sensation nl, motor no focal deficit.

## 2022-07-19 NOTE — H&P ADULT - NSHPSOCIALHISTORY_GEN_ALL_CORE
Currently unemployed ,Living with his mother and girlfriend in [place]. Do not smoke, drink beers socially every couple of weeks, admit marijuana use (the most recent one was 4 weeks ago), denied other recreational drug usage.

## 2022-07-19 NOTE — H&P ADULT - ASSESSMENT
27 year old male with significant family history of DM2 and HLD, no known past medical history was admitted for newly diagnosed diabetes melitis in the setting of 2 week course of nausea/vomiting, polydipsia/polyuria as well as significant weight loss.

## 2022-07-20 ENCOUNTER — TRANSCRIPTION ENCOUNTER (OUTPATIENT)
Age: 27
End: 2022-07-20

## 2022-07-20 DIAGNOSIS — E88.81 METABOLIC SYNDROME AND OTHER INSULIN RESISTANCE: ICD-10-CM

## 2022-07-20 LAB
ANION GAP SERPL CALC-SCNC: 14 MMOL/L — SIGNIFICANT CHANGE UP (ref 7–14)
B-OH-BUTYR SERPL-SCNC: 0.7 MMOL/L — HIGH (ref 0–0.4)
BUN SERPL-MCNC: 12 MG/DL — SIGNIFICANT CHANGE UP (ref 7–23)
CALCIUM SERPL-MCNC: 9.6 MG/DL — SIGNIFICANT CHANGE UP (ref 8.4–10.5)
CHLORIDE SERPL-SCNC: 96 MMOL/L — LOW (ref 98–107)
CHOLEST SERPL-MCNC: 224 MG/DL — HIGH
CO2 SERPL-SCNC: 27 MMOL/L — SIGNIFICANT CHANGE UP (ref 22–31)
CREAT SERPL-MCNC: 1.16 MG/DL — SIGNIFICANT CHANGE UP (ref 0.5–1.3)
EGFR: 89 ML/MIN/1.73M2 — SIGNIFICANT CHANGE UP
GLUCOSE BLDC GLUCOMTR-MCNC: 227 MG/DL — HIGH (ref 70–99)
GLUCOSE BLDC GLUCOMTR-MCNC: 249 MG/DL — HIGH (ref 70–99)
GLUCOSE BLDC GLUCOMTR-MCNC: 286 MG/DL — HIGH (ref 70–99)
GLUCOSE BLDC GLUCOMTR-MCNC: 293 MG/DL — HIGH (ref 70–99)
GLUCOSE BLDC GLUCOMTR-MCNC: 307 MG/DL — HIGH (ref 70–99)
GLUCOSE SERPL-MCNC: 279 MG/DL — HIGH (ref 70–99)
HDLC SERPL-MCNC: 24 MG/DL — LOW
LIPID PNL WITH DIRECT LDL SERPL: 171 MG/DL — HIGH
MAGNESIUM SERPL-MCNC: 2.1 MG/DL — SIGNIFICANT CHANGE UP (ref 1.6–2.6)
NON HDL CHOLESTEROL: 200 MG/DL — HIGH
PHOSPHATE SERPL-MCNC: 4.4 MG/DL — SIGNIFICANT CHANGE UP (ref 2.5–4.5)
POTASSIUM SERPL-MCNC: 4.3 MMOL/L — SIGNIFICANT CHANGE UP (ref 3.5–5.3)
POTASSIUM SERPL-SCNC: 4.3 MMOL/L — SIGNIFICANT CHANGE UP (ref 3.5–5.3)
SODIUM SERPL-SCNC: 137 MMOL/L — SIGNIFICANT CHANGE UP (ref 135–145)
T4 FREE SERPL-MCNC: 1.5 NG/DL — SIGNIFICANT CHANGE UP (ref 0.9–1.8)
TRIGL SERPL-MCNC: 146 MG/DL — SIGNIFICANT CHANGE UP
TSH SERPL-MCNC: 0.76 UIU/ML — SIGNIFICANT CHANGE UP (ref 0.27–4.2)

## 2022-07-20 PROCEDURE — 99232 SBSQ HOSP IP/OBS MODERATE 35: CPT

## 2022-07-20 PROCEDURE — 99233 SBSQ HOSP IP/OBS HIGH 50: CPT | Mod: GC

## 2022-07-20 RX ORDER — SODIUM CHLORIDE 9 MG/ML
1000 INJECTION, SOLUTION INTRAVENOUS
Refills: 0 | Status: DISCONTINUED | OUTPATIENT
Start: 2022-07-20 | End: 2022-07-20

## 2022-07-20 RX ORDER — INSULIN GLARGINE 100 [IU]/ML
32 INJECTION, SOLUTION SUBCUTANEOUS
Refills: 0 | Status: DISCONTINUED | OUTPATIENT
Start: 2022-07-20 | End: 2022-07-20

## 2022-07-20 RX ORDER — INSULIN GLARGINE 100 [IU]/ML
26 INJECTION, SOLUTION SUBCUTANEOUS
Refills: 0 | Status: DISCONTINUED | OUTPATIENT
Start: 2022-07-20 | End: 2022-07-20

## 2022-07-20 RX ORDER — INSULIN GLARGINE 100 [IU]/ML
30 INJECTION, SOLUTION SUBCUTANEOUS
Refills: 0 | Status: DISCONTINUED | OUTPATIENT
Start: 2022-07-20 | End: 2022-07-21

## 2022-07-20 RX ORDER — INSULIN LISPRO 100/ML
VIAL (ML) SUBCUTANEOUS
Refills: 0 | Status: DISCONTINUED | OUTPATIENT
Start: 2022-07-20 | End: 2022-07-22

## 2022-07-20 RX ORDER — INSULIN LISPRO 100/ML
VIAL (ML) SUBCUTANEOUS AT BEDTIME
Refills: 0 | Status: DISCONTINUED | OUTPATIENT
Start: 2022-07-20 | End: 2022-07-22

## 2022-07-20 RX ORDER — INSULIN LISPRO 100/ML
8 VIAL (ML) SUBCUTANEOUS
Refills: 0 | Status: DISCONTINUED | OUTPATIENT
Start: 2022-07-20 | End: 2022-07-21

## 2022-07-20 RX ORDER — ATORVASTATIN CALCIUM 80 MG/1
40 TABLET, FILM COATED ORAL AT BEDTIME
Refills: 0 | Status: DISCONTINUED | OUTPATIENT
Start: 2022-07-20 | End: 2022-07-22

## 2022-07-20 RX ADMIN — SODIUM CHLORIDE 125 MILLILITER(S): 9 INJECTION, SOLUTION INTRAVENOUS at 02:59

## 2022-07-20 RX ADMIN — Medication 2: at 18:21

## 2022-07-20 RX ADMIN — SODIUM CHLORIDE 125 MILLILITER(S): 9 INJECTION, SOLUTION INTRAVENOUS at 10:26

## 2022-07-20 RX ADMIN — INSULIN GLARGINE 30 UNIT(S): 100 INJECTION, SOLUTION SUBCUTANEOUS at 14:47

## 2022-07-20 RX ADMIN — Medication 4: at 02:28

## 2022-07-20 RX ADMIN — Medication 4: at 10:30

## 2022-07-20 RX ADMIN — Medication 8 UNIT(S): at 18:20

## 2022-07-20 RX ADMIN — Medication 1 TABLET(S): at 18:20

## 2022-07-20 RX ADMIN — Medication 6: at 06:03

## 2022-07-20 RX ADMIN — PANTOPRAZOLE SODIUM 40 MILLIGRAM(S): 20 TABLET, DELAYED RELEASE ORAL at 06:10

## 2022-07-20 RX ADMIN — ATORVASTATIN CALCIUM 40 MILLIGRAM(S): 80 TABLET, FILM COATED ORAL at 22:04

## 2022-07-20 RX ADMIN — Medication 8 UNIT(S): at 13:22

## 2022-07-20 RX ADMIN — Medication 1: at 22:03

## 2022-07-20 RX ADMIN — Medication 3: at 13:21

## 2022-07-20 NOTE — DISCHARGE NOTE PROVIDER - HOSPITAL COURSE
Mr. Brush is a 26 year old gentleman with significant family history of DM2 and HLD, no known past medical history was admitted for newly diagnosed diabetes mellitus A1c 12.8 and HSS/DKA in the setting of 2 week course of nausea/vomiting, polydipsia/polyuria as well as significant weight loss. During three day course of hospitalization, he received IVF, basal bolus insulin as well as insulin sliding scale and frequent FS glucose monitoring, per endocrine team recommendations. His anion gap is back to normal and ketone production is minimal after frequent monitoring of metabolic laboratory tests.    Mr. Brush is a 26 year old gentleman with significant family history of DM2 and HLD, no known past medical history was admitted for newly diagnosed diabetes mellitus A1c 12.8 and HSS/DKA in the setting of 2 week course of nausea/vomiting, polydipsia/polyuria as well as significant weight loss. During three day course of hospitalization, he received IVF, basal bolus insulin as well as insulin sliding scale and frequent FS glucose and lab monitoring, per endocrine team recommendations. His anion gap is back to normal and ketone production is minimal now. He is able to tolerate well with food with well controlled blood glucose. The test results for typing DM are still pending and will be discussed in the outpatient follow-ups. Pt is medically stable and ready to be discharged with optimized insulin regimen ??????? and supplies. Bedside instruction on insulin injection and daily glucose monitoring were delivered to patient as well as dietary education on healthy eating and lifestyle. Please follow up with endocrine at the [facility] for long term management of DM.     During hospitalization, pt is also found to have elevated blood pressure and lipids. He was started on lipitor 40 mg daily and lisinopril 10mg daily to prevent cardiovascular complications. Please follow up with PCP in the outpatient setting for the long term management of conditions.    Mr. Brush is a 26 year old gentleman with significant family history of DM2 and HLD, no known past medical history was admitted for newly diagnosed diabetes mellitus A1c 12.8 and HSS/DKA in the setting of 2 week course of nausea/vomiting, polydipsia/polyuria as well as significant weight loss. During three day course of hospitalization, he received IVF, basal bolus insulin as well as insulin sliding scale and frequent FS glucose and lab monitoring, per endocrine team recommendations. His anion gap is back to normal and ketone production is minimal now. He is able to tolerate well with food with well controlled blood glucose. His C-peptide level is 2.2 WNL, favoring type II DM and the diagnosis will be discussed in the outpatient follow-ups. Pt is medically stable and ready to be discharged with optimized insulin regimen ??????? and supplies. Bedside instruction on insulin injection and daily glucose monitoring were delivered to patient as well as dietary education on healthy eating and lifestyle. Please follow up with endocrine at the [facility] for long term management of DM.     During hospitalization, pt is also found to have elevated blood pressure and lipids. He was started on lipitor 40 mg daily and lisinopril 10mg daily to prevent cardiovascular complications. Please follow up with PCP in the outpatient setting for the long term management of conditions.    Mr. Brush is a 26 year old gentleman with significant family history of DM2 and HLD, no known past medical history was admitted for newly diagnosed diabetes mellitus A1c 12.8 and HSS/DKA in the setting of 2 week course of nausea/vomiting, polydipsia/polyuria as well as significant weight loss. During three day course of hospitalization, he received IVF, basal bolus insulin as well as insulin sliding scale and frequent FS glucose and lab monitoring, per endocrine team recommendations. His anion gap is back to normal and ketone production is minimal now. He is able to tolerate well with food with well controlled blood glucose. His C-peptide level is 2.2 WNL, favoring type II DM and the diagnosis will be discussed in the outpatient follow-ups. Pt is medically stable and ready to be discharged with optimized insulin regimen (Lantus Solostar pen 36 units daily after dinner, 12 units pre-meal TID (hold if not eating) ) and supplies. Bedside instruction on insulin injection and daily glucose monitoring were delivered to patient as well as dietary education on healthy eating and lifestyle. Please follow up with endocrine appointment in one week at the [facility] for long term management of DM.     During hospitalization, pt is also found to have elevated blood pressure and lipids. He was started on lipitor 40 mg daily and lisinopril 10mg daily to prevent cardiovascular complications. Please follow up with PCP in the outpatient setting for the long term management of conditions.    Mr. Brush is a 26 year old gentleman with significant family history of DM2 and HLD, no known past medical history was admitted for newly diagnosed diabetes mellitus A1c 12.8 and HSS/DKA in the setting of 2 week course of nausea/vomiting, polydipsia/polyuria as well as significant weight loss. During three day course of hospitalization, he received IVF, basal bolus insulin as well as insulin sliding scale and frequent FS glucose and lab monitoring, per endocrine team recommendations. His anion gap is back to normal and ketone production is minimal now. He is able to tolerate well with food with well controlled blood glucose. His C-peptide level is 2.2 WNL, favoring type II DM and the diagnosis will be discussed in the outpatient follow-ups. Pt is medically stable and ready to be discharged with optimized insulin regimen (Lantus 36 units daily after dinner, lispro 12 units pre-meal TID (hold if not eating) ) and supplies. Bedside instruction on insulin injection and daily glucose monitoring were delivered to patient as well as dietary education on healthy eating and lifestyle. Please follow up with endocrine appointment in one week at the [facility] for long term management of DM.     During hospitalization, pt is also found to have elevated blood pressure and lipids. He was started on lipitor 40 mg daily and lisinopril 10mg daily to prevent cardiovascular complications. Please follow up with PCP in the outpatient setting for the long term management of conditions.

## 2022-07-20 NOTE — DIETITIAN INITIAL EVALUATION ADULT - OTHER INFO
Regular diet na Per chart, 27 year old male with significant family history of DM2 and HLD, no known past medical history was admitted for newly diagnosed diabetes melitis in the setting of 2 week course of nausea/vomiting, polydipsia/polyuria as well as significant weight loss.     Nutrition interview: No recent episodes of nausea, vomiting, diarrhea or constipation, BM noted x3d ago per Pt. Pt believes this is attributed to not eating well prior to admission. Denies any chewing/swallowing difficulties. No food allergies. Stated UBW: ~240-250#, in line with current wt. Food preferences explored and noted. Pt remains NPO at this time.     RD provided pt with handout Carbohydrate Counting for People with Diabetes. Discussed carbohydrate sources, carbohydrate portions, protein sources, mixed meals, and nutrition label reading. Stressed importance of balanced meals with adequate protein and fiber. Pt was informed of current A1c, goal A1c, and goal fingerstick range.

## 2022-07-20 NOTE — PROGRESS NOTE ADULT - ASSESSMENT
27M new onset DM presenting with severe hyperglycemia to 700s, mild DKA    1) DKA  resolved.  AG closed.  BHB trending down  Now on diet    2) New DM - ketosis prone DM2 vs rule out DM1  clarify type of DM  check MICHAEL, islet cell and zinc transporter ab  check c-peptide as outpatient when glucose toxicity is reduced  HBA1c 12.8%  Increased lantus to 30 units q 1pm- 1st dose today  agree with Admelog 8 units tid ac- hold if npo/skip smeal  OFollow up RD consult  Target bg 100-180 mg/dl  Target a1c < 7%  BEDSIDE RN to education patient on how to use glucometer, administer insulin with vial and syringe and hypoglycemia s/s and management    Discharge plan:  Reviewed importance of dietary modifications  uninsured  will need glucometer teaching, insulin admin teaching either vial and/or pen depending on final dc plan  Referred to transitions of care pharmacist  may consider using coupons for short term supply of basal bolus insulin vs. Novolin 70/30 either vial from [employer] or pens from [employer].  To discuss further with patient tomorrow.  [place] [facility] [place] [address] [facility], [phone]    3) HTN  BP goal < 130/80  elevated on admission  would trend and consider need for therapy    4) HLD  ldl target , 70  Recommend starting moderate intensity statin    5) Thyromegaly, nontender  TSH, Ft4 within target range      Complex patient high level decision making.        Maria Luisa Gray  Nurse Practitioner  Division of Endocrinology & Diabetes  Pager # [phone]      If after 6PM or before 9AM, or on weekends/holidays, please call endocrine answering service for assistance ([phone]).  For nonurgent matters email [email or web address] for assistance.

## 2022-07-20 NOTE — PROGRESS NOTE ADULT - PROBLEM SELECTOR PLAN 3
- sodium 128 admitted, serum glucose 720  - Corrected sodium 138  - Will trend lab AM - low level HDL, HTN, DM  - Strong family history of DM and HLD  - Start atorvastatin 40mg   - May start ace-i tomorrow

## 2022-07-20 NOTE — PROGRESS NOTE ADULT - SUBJECTIVE AND OBJECTIVE BOX
PROGRESS NOTE:     Patient is a 27y old  Male who presents with a chief complaint of nausea/vomiting (2022 17:54)      SUBJECTIVE / OVERNIGHT EVENTS:  NAEON   ADDITIONAL REVIEW OF SYSTEMS:    MEDICATIONS  (STANDING):  dextrose 5% + lactated ringers. 1000 milliLiter(s) (125 mL/Hr) IV Continuous <Continuous>  dextrose 5%. 1000 milliLiter(s) (100 mL/Hr) IV Continuous <Continuous>  dextrose 5%. 1000 milliLiter(s) (50 mL/Hr) IV Continuous <Continuous>  dextrose 50% Injectable 25 Gram(s) IV Push once  dextrose 50% Injectable 12.5 Gram(s) IV Push once  dextrose 50% Injectable 25 Gram(s) IV Push once  glucagon  Injectable 1 milliGRAM(s) IntraMuscular once  insulin glargine Injectable (LANTUS) 23 Unit(s) SubCutaneous <User Schedule>  insulin lispro (ADMELOG) corrective regimen sliding scale   SubCutaneous every 4 hours  lactated ringers. 1000 milliLiter(s) (125 mL/Hr) IV Continuous <Continuous>  pantoprazole    Tablet 40 milliGRAM(s) Oral before breakfast    MEDICATIONS  (PRN):  dextrose Oral Gel 15 Gram(s) Oral once PRN Blood Glucose LESS THAN 70 milliGRAM(s)/deciliter      CAPILLARY BLOOD GLUCOSE      POCT Blood Glucose.: 288 mg/dL (2022 06:02)  POCT Blood Glucose.: 233 mg/dL (2022 02:15)  POCT Blood Glucose.: 264 mg/dL (2022 22:20)  POCT Blood Glucose.: 341 mg/dL (2022 18:45)  POCT Blood Glucose.: 368 mg/dL (2022 17:08)  POCT Blood Glucose.: 389 mg/dL (2022 15:54)  POCT Blood Glucose.: 447 mg/dL (2022 14:22)  POCT Blood Glucose.: 439 mg/dL (2022 13:15)  POCT Blood Glucose.: 473 mg/dL (2022 12:33)  POCT Blood Glucose.: 512 mg/dL (2022 11:23)    I&O's Summary      PHYSICAL EXAM:  Vital Signs Last 24 Hrs  T(C): 36.7 (2022 05:30), Max: 36.7 (2022 22:03)  T(F): 98.1 (2022 05:30), Max: 98.1 (2022 05:30)  HR: 86 (2022 05:30) (54 - 96)  BP: 135/95 (2022 05:30) (135/95 - 154/106)  BP(mean): --  RR: 16 (2022 05:30) (14 - 16)  SpO2: 100% (2022 05:30) (98% - 100%)    Parameters below as of 2022 05:30  Patient On (Oxygen Delivery Method): room air        GENERAL: No acute distress, well-developed  HEAD:  Atraumatic, Normocephalic  EYES: EOMI, PERRLA, conjunctiva and sclera clear  NECK: Supple, no lymphadenopathy, no JVD  CHEST/LUNG: CTAB; No wheezes, rales, or rhonchi  HEART: Regular rate and rhythm; No murmurs, rubs, or gallops  ABDOMEN: Soft, non-tender, non-distended; normal bowel sounds, no organomegaly  EXTREMITIES:  2+ peripheral pulses b/l, No clubbing, cyanosis, or edema  NEUROLOGY: A&O x 3, no focal deficits  SKIN: No rashes or lesions    LABS:                        16.3   5.18  )-----------( 336      ( 2022 09:23 )             46.0     07-19    137  |  96<L>  |  13  ----------------------------<  356<H>  4.1   |  26  |  1.13    Ca    9.9      2022 18:27  Phos  4.6     07-19  Mg     2.40     07-19    TPro  8.7<H>  /  Alb  4.8  /  TBili  1.0  /  DBili  x   /  AST  43<H>  /  ALT  79<H>  /  AlkPhos  83  07-19          Urinalysis Basic - ( 2022 09:23 )    Color: Light Yellow / Appearance: Clear / S.029 / pH: x  Gluc: x / Ketone: Small  / Bili: Negative / Urobili: <2 mg/dL   Blood: x / Protein: Negative / Nitrite: Negative   Leuk Esterase: Negative / RBC: 0 /HPF / WBC 1 /HPF   Sq Epi: x / Non Sq Epi: 0 /HPF / Bacteria: x          RADIOLOGY & ADDITIONAL TESTS:  Results Reviewed:   Imaging Personally Reviewed:  Electrocardiogram Personally Reviewed:    COORDINATION OF CARE:  Care Discussed with Consultants/Other Providers [Y/N]:  Prior or Outpatient Records Reviewed [Y/N]:   PROGRESS NOTE:     Patient is a 27y old  Male who presents with a chief complaint of nausea/vomiting (2022 17:54)      SUBJECTIVE / OVERNIGHT EVENTS:  NAEON. Clinically improving, denied nausea, vomiting, abdominal pain    ADDITIONAL REVIEW OF SYSTEMS:    MEDICATIONS  (STANDING):  dextrose 5% + lactated ringers. 1000 milliLiter(s) (125 mL/Hr) IV Continuous <Continuous>  dextrose 5%. 1000 milliLiter(s) (100 mL/Hr) IV Continuous <Continuous>  dextrose 5%. 1000 milliLiter(s) (50 mL/Hr) IV Continuous <Continuous>  dextrose 50% Injectable 25 Gram(s) IV Push once  dextrose 50% Injectable 12.5 Gram(s) IV Push once  dextrose 50% Injectable 25 Gram(s) IV Push once  glucagon  Injectable 1 milliGRAM(s) IntraMuscular once  insulin glargine Injectable (LANTUS) 23 Unit(s) SubCutaneous <User Schedule>  insulin lispro (ADMELOG) corrective regimen sliding scale   SubCutaneous every 4 hours  lactated ringers. 1000 milliLiter(s) (125 mL/Hr) IV Continuous <Continuous>  pantoprazole    Tablet 40 milliGRAM(s) Oral before breakfast    MEDICATIONS  (PRN):  dextrose Oral Gel 15 Gram(s) Oral once PRN Blood Glucose LESS THAN 70 milliGRAM(s)/deciliter      CAPILLARY BLOOD GLUCOSE      POCT Blood Glucose.: 288 mg/dL (2022 06:02)  POCT Blood Glucose.: 233 mg/dL (2022 02:15)  POCT Blood Glucose.: 264 mg/dL (2022 22:20)  POCT Blood Glucose.: 341 mg/dL (2022 18:45)  POCT Blood Glucose.: 368 mg/dL (2022 17:08)  POCT Blood Glucose.: 389 mg/dL (2022 15:54)  POCT Blood Glucose.: 447 mg/dL (2022 14:22)  POCT Blood Glucose.: 439 mg/dL (2022 13:15)  POCT Blood Glucose.: 473 mg/dL (2022 12:33)  POCT Blood Glucose.: 512 mg/dL (2022 11:23)    I&O's Summary      PHYSICAL EXAM:  Vital Signs Last 24 Hrs  T(C): 36.7 (2022 05:30), Max: 36.7 (2022 22:03)  T(F): 98.1 (2022 05:30), Max: 98.1 (2022 05:30)  HR: 86 (2022 05:30) (54 - 96)  BP: 135/95 (2022 05:30) (135/95 - 154/106)  BP(mean): --  RR: 16 (2022 05:30) (14 - 16)  SpO2: 100% (2022 05:30) (98% - 100%)    Parameters below as of 2022 05:30  Patient On (Oxygen Delivery Method): room air        GENERAL: No acute distress, well-developed  HEAD:  Atraumatic, Normocephalic  EYES: EOMI, PERRLA, conjunctiva and sclera clear  NECK: Supple, no lymphadenopathy, no JVD  CHEST/LUNG: CTAB; No wheezes, rales, or rhonchi  HEART: Regular rate and rhythm; No murmurs, rubs, or gallops  ABDOMEN: Soft, non-tender, non-distended; normal bowel sounds, hepatomegaly palpated  EXTREMITIES:  2+ peripheral pulses b/l, No clubbing, cyanosis, or edema  NEUROLOGY: A&O x 3, no focal deficits  SKIN: No rashes or lesions    LABS:                        16.3   5.18  )-----------( 336      ( 2022 09:23 )             46.0     07-19    137  |  96<L>  |  13  ----------------------------<  356<H>  4.1   |  26  |  1.13    Ca    9.9      2022 18:27  Phos  4.6     07-19  Mg     2.40     07-19    TPro  8.7<H>  /  Alb  4.8  /  TBili  1.0  /  DBili  x   /  AST  43<H>  /  ALT  79<H>  /  AlkPhos  83  07-19          Urinalysis Basic - ( 2022 09:23 )    Color: Light Yellow / Appearance: Clear / S.029 / pH: x  Gluc: x / Ketone: Small  / Bili: Negative / Urobili: <2 mg/dL   Blood: x / Protein: Negative / Nitrite: Negative   Leuk Esterase: Negative / RBC: 0 /HPF / WBC 1 /HPF   Sq Epi: x / Non Sq Epi: 0 /HPF / Bacteria: x          RADIOLOGY & ADDITIONAL TESTS:  Results Reviewed:   Imaging Personally Reviewed:  Electrocardiogram Personally Reviewed:    COORDINATION OF CARE:  Care Discussed with Consultants/Other Providers [Y/N]:  Prior or Outpatient Records Reviewed [Y/N]:

## 2022-07-20 NOTE — DISCHARGE NOTE PROVIDER - NSDCMRMEDTOKEN_GEN_ALL_CORE_FT
omeprazole 20 mg oral delayed release tablet: 1 tab(s) orally once a day   alcohol swabs : Apply topically to affected area 4 times a day   atorvastatin 40 mg oral tablet: 1 tab(s) orally once a day (at bedtime)  glucometer (per patient&#x27;s insurance): Test blood sugars four times a day. Dispense #1 glucometer.  lisinopril 10 mg oral tablet: 1 tab(s) orally once a day  Multiple Vitamins oral tablet: 1 tab(s) orally once a day  omeprazole 20 mg oral delayed release tablet: 1 tab(s) orally once a day  test strips (per patient&#x27;s insurance): 1 application subcutaneously 4 times a day. ** Compatible with patient&#x27;s glucometer **   alcohol swabs : Apply topically to affected area 4 times a day   atorvastatin 40 mg oral tablet: 1 tab(s) orally once a day (at bedtime)  glucometer (per patient&#x27;s insurance): Test blood sugars four times a day. Dispense #1 glucometer.  Insulin Pen Needles, 4mm: 1 application subcutaneously 4 times a day. ** Use with insulin pen **   lancets: 1 application subcutaneously 4 times a day   lisinopril 10 mg oral tablet: 1 tab(s) orally once a day  Multiple Vitamins oral tablet: 1 tab(s) orally once a day  omeprazole 20 mg oral delayed release tablet: 1 tab(s) orally once a day  test strips (per patient&#x27;s insurance): 1 application subcutaneously 4 times a day. ** Compatible with patient&#x27;s glucometer **   alcohol swabs : Apply topically to affected area 4 times a day   atorvastatin 40 mg oral tablet: 1 tab(s) orally once a day (at bedtime)  glucometer (per patient&#x27;s insurance): Test blood sugars four times a day. Dispense #1 glucometer.  HumaLOG KwikPen 100 units/mL injectable solution: 12 unit(s) injectable 3 times a day (before meals)   Insulin Pen Needles, 4mm: 1 application subcutaneously 4 times a day. ** Use with insulin pen **   lancets: 1 application subcutaneously 4 times a day   Lantus Solostar Pen 100 units/mL subcutaneous solution: 36  subcutaneous once a day (at bedtime)   lisinopril 10 mg oral tablet: 1 tab(s) orally once a day  Multiple Vitamins oral tablet: 1 tab(s) orally once a day  omeprazole 20 mg oral delayed release tablet: 1 tab(s) orally once a day  test strips (per patient&#x27;s insurance): 1 application subcutaneously 4 times a day. ** Compatible with patient&#x27;s glucometer **

## 2022-07-20 NOTE — DIETITIAN INITIAL EVALUATION ADULT - NS FNS DIET ORDER
Diet, Consistent Carbohydrate w/Evening Snack:   DASH/TLC {Sodium & Cholesterol Restricted} (DASH) (07-20-22 @ 11:05)

## 2022-07-20 NOTE — PROGRESS NOTE ADULT - PROBLEM SELECTOR PLAN 4
- Hepatomegaly with steatosis noted in the RUQ US  - Mild elevation of AST/ALT 43/79  <2 , normal ALK, Bilirubin  - Dx: fatty liver disease, alcohol use, medications  - Will trend lab and follow up in the outpatient - sodium 128 admitted, serum glucose 720  - Corrected sodium 138  - Will trend lab AM

## 2022-07-20 NOTE — DISCHARGE NOTE PROVIDER - NSDCFUADDAPPT_GEN_ALL_CORE_FT
An appointment to establish care with a primary care provider has been scheduled for you at the above clinic for 7/27/22 at 1:30 PM. Please call [phone] if you are unable to keep this appointment.    Please schedule to make an appointment with the endocrinology clinic at the same location and the same phone number as above. An appointment to establish care with a primary care provider has been scheduled for you at the above clinic for 7/27/22 at 1:30 PM. Please call [phone] if you are unable to keep this appointment.    And your appointment with the endocrinology clinic is scheduled on 11/08/22 at 9:20am at the same location and the same phone number as above.

## 2022-07-20 NOTE — PROGRESS NOTE ADULT - PROBLEM SELECTOR PLAN 2
- Newly diagnosed with A1C 12.8 and serum glucose 720, beta hydroxy mildly elevated to 1.2, serum lactate mildly elevated to 3. Potassium 5.3.  - Etiology: multifactorial with strong family history of DM and HLD and heavy use of juice and soda since childhood. Have not seen PCP due to insurance issue since age of 17.   - IL IVF x1 given at the ED. Another IVF given on the floor due to hypovolemia from NPO and polyuria.  - Lantus 23 units given at the ED and started on moderate insulin sliding scale and FS Q4, will continue Lantus 23 units daily  - Will monitor his BMP, VBG, lactate, and beta hydroxy frequently  - FS glucose steadily improving down to 300s.   - Adjust insulin regimen based on labs  - Follow endo recs to look into type:      check MICHAEL, islet cell and zinc transporter ab     check c-peptide as outpatient when glucose toxicity is reduced - Newly diagnosed with A1C 12.8 and serum glucose 720, beta hydroxy mildly elevated to 1.2, serum lactate mildly elevated to 3. Potassium 5.3.  - Etiology: multifactorial with strong family history of DM and HLD and heavy use of juice and soda since childhood. Have not seen PCP due to insurance issue since age of 17.   - IL IVF x1 given at the ED. Another IVF given on the floor due to hypovolemia from NPO and polyuria.  - Lantus 23 units given at the ED and started on moderate insulin sliding scale and FS Q4, will continue Lantus 23 units daily  - Will monitor his BMP, VBG, lactate, and beta hydroxy frequently  - FS glucose steadily improving down to 300s.   - Adjust insulin regimen based on labs  - Follow endo recs to look into type:      check MICHAEL, islet cell and zinc transporter ab     check c-peptide as outpatient when glucose toxicity is reduced  - AG < 15, BHB 0.7 <1, pt is started on DASH diet, D5LR DCed 07/20  - Currently on lantus 30 units daily, amelog 8 units TID premeal, low dose sliding scale 07/20

## 2022-07-20 NOTE — DIETITIAN INITIAL EVALUATION ADULT - ORAL INTAKE PTA/DIET HISTORY
Pt lives at home with his mom and his girlfriend. He states he mostly orders in foods ([employer], [employer], [employer], Deli, Pizza, Chinese). Pt states that sometimes his girlfriend will cook. No specific diet followed. No supplements/vitamins taken PTA. Pt states he had poor PO intake x2 weeks PTA 2/2 nausea, vomiting and stomach pain- would only drink juice.

## 2022-07-20 NOTE — DIETITIAN INITIAL EVALUATION ADULT - PERTINENT MEDS FT
MEDICATIONS  (STANDING):  atorvastatin 40 milliGRAM(s) Oral at bedtime  dextrose 5%. 1000 milliLiter(s) (100 mL/Hr) IV Continuous <Continuous>  dextrose 5%. 1000 milliLiter(s) (50 mL/Hr) IV Continuous <Continuous>  dextrose 50% Injectable 25 Gram(s) IV Push once  dextrose 50% Injectable 12.5 Gram(s) IV Push once  dextrose 50% Injectable 25 Gram(s) IV Push once  glucagon  Injectable 1 milliGRAM(s) IntraMuscular once  insulin glargine Injectable (LANTUS) 30 Unit(s) SubCutaneous <User Schedule>  insulin lispro (ADMELOG) corrective regimen sliding scale   SubCutaneous three times a day before meals  insulin lispro (ADMELOG) corrective regimen sliding scale   SubCutaneous at bedtime  insulin lispro Injectable (ADMELOG) 8 Unit(s) SubCutaneous three times a day before meals  lactated ringers. 1000 milliLiter(s) (125 mL/Hr) IV Continuous <Continuous>  pantoprazole    Tablet 40 milliGRAM(s) Oral before breakfast    MEDICATIONS  (PRN):  dextrose Oral Gel 15 Gram(s) Oral once PRN Blood Glucose LESS THAN 70 milliGRAM(s)/deciliter

## 2022-07-20 NOTE — PROGRESS NOTE ADULT - PROBLEM SELECTOR PLAN 5
- Ordered TSH, lipid panel to look into metabolic syndrome   - NO DVT prophylaxis is indicated as pt is able to ambulate  - NPO now - Hepatomegaly with steatosis noted in the RUQ US  - Mild elevation of AST/ALT 43/79  <2 , normal ALK, Bilirubin  - Dx: fatty liver disease, alcohol use, medications  - Will trend lab and follow up in the outpatient

## 2022-07-20 NOTE — DISCHARGE NOTE PROVIDER - NSFOLLOWUPCLINICS_GEN_ALL_ED_FT
[facility] [place]  Internal Medicine  [address] [place] [address]  Phone: [phone]  Fax: [phone]  Scheduled Appointment: 7/27/2022 1:30 PM

## 2022-07-20 NOTE — DIETITIAN INITIAL EVALUATION ADULT - PERTINENT LABORATORY DATA
07-20 Na 137 mmol/L Glu 279 mg/dL<H> K+ 4.3 mmol/L Cr 1.16 mg/dL BUN 12 mg/dL Phos 4.4 mg/dL  07-20 @ 14:47 POCT 307 mg/dL  07-20 @ 12:24 POCT 286 mg/dL  07-20 @ 10:29 POCT 249 mg/dL  07-20 @ 06:02 POCT 288 mg/dL  07-20 @ 02:15 POCT 233 mg/dL  07-19 @ 22:20 POCT 264 mg/dL  07-19 @ 18:45 POCT 341 mg/dL  07-19 @ 17:08 POCT 368 mg/dL    POCT Blood Glucose.: 307 mg/dL (07-20-22 @ 14:47)  A1C with Estimated Average Glucose Result: 12.8 % (07-19-22 @ 12:33)

## 2022-07-20 NOTE — DISCHARGE NOTE PROVIDER - NSDCCPCAREPLAN_GEN_ALL_CORE_FT
PRINCIPAL DISCHARGE DIAGNOSIS  Diagnosis: Uncontrolled diabetes mellitus with hyperglycemia  Assessment and Plan of Treatment: You were admitted to hospital for elevated blood glucose and later found to be diagnosed with diabetes mellitus. The level of your blood glucose is critically high that could be life threatening. We treated you with fluids and insulin and monitored your blood glucose, electrolytes, acidity and metabolites frequently. Now your blood glucose is well controlled with an optimized insulin regimen ??   . Education on diet, glucometer, and insulin usage were also delivered to you at the bed side. Please make sure you check you blood glucose regularly everyday and adhere to your insulin injection per instruction. Pleaese follow up with endocrine doctors in 1 week after being discharged.    If you developed feelings of nausea, vomitng, acute adominal pain, and/ or your glucose is extremely high > 300, please come back to ED.      SECONDARY DISCHARGE DIAGNOSES  Diagnosis: Frequent PVCs  Assessment and Plan of Treatment:     Diagnosis: HTN (hypertension)  Assessment and Plan of Treatment: Your bloood pressure is elevated, higher than optimal range. Because  you also have diabetes, we started blood pressure medication lisinopril 10mg daily to prevent kidney damage from diabetes. Please monitor your blood pressure regularly at home after discharge and follow up with your primary care doctor for long term management of hypertension.    Diagnosis: HLD (hyperlipidemia)  Assessment and Plan of Treatment: Your blood cholesterol is elevated, higher than optimal range.  We started lipitor 40mg daily to prevent its damage to your heart and blood vessles. Please follow up with your primary care doctor for long term management of hyperlipidemia.     PRINCIPAL DISCHARGE DIAGNOSIS  Diagnosis: Uncontrolled diabetes mellitus with hyperglycemia  Assessment and Plan of Treatment: You were admitted to hospital for elevated blood glucose and later found to be diagnosed with diabetes mellitus. The level of your blood glucose is critically high that could be life threatening. We treated you with fluids and insulin and monitored your blood glucose, electrolytes, acidity and metabolites frequently. Now your blood glucose is well controlled with an optimized insulin regimen ??   . Education on diet, glucometer, and insulin usage were also delivered to you at the bed side. Please make sure you check you blood glucose regularly everyday and adhere to your insulin injection per instruction. Pleaese follow up with endocrine doctors in 1 week after being discharged.    If you developed feelings of nausea, vomitng, acute adominal pain, and your glucose is extremely high > 250, please come back to ED.      SECONDARY DISCHARGE DIAGNOSES  Diagnosis: Frequent PVCs  Assessment and Plan of Treatment:     Diagnosis: HTN (hypertension)  Assessment and Plan of Treatment: Your bloood pressure is elevated, higher than optimal range. Because  you also have diabetes, we started blood pressure medication lisinopril 10mg daily to prevent kidney damage from diabetes. Please monitor your blood pressure regularly at home after discharge and follow up with your primary care doctor for long term management of hypertension.    Diagnosis: HLD (hyperlipidemia)  Assessment and Plan of Treatment: Your blood cholesterol is elevated, higher than optimal range.  We started lipitor 40mg daily to prevent its damage to your heart and blood vessles. Please follow up with your primary care doctor for long term management of hyperlipidemia.     PRINCIPAL DISCHARGE DIAGNOSIS  Diagnosis: Uncontrolled diabetes mellitus with hyperglycemia  Assessment and Plan of Treatment: You were admitted to hospital for elevated blood glucose and later found to be diagnosed with diabetes mellitus. The level of your blood glucose is critically high that could be life threatening. We treated you with fluids and insulin and monitored your blood glucose, electrolytes, acidity and metabolites frequently. Now your blood glucose is well controlled with an optimized insulin regimen ??   . Education on diet, glucometer, and insulin usage were also delivered to you at the bed side. Please make sure you check you blood glucose regularly everyday and adhere to your insulin injection per instruction. Pleaese follow up with endocrine doctors in 1 week after being discharged.    If you developed feelings of nausea, vomitng, acute adominal pain, and your glucose is extremely high > 250, please come back to ED.      SECONDARY DISCHARGE DIAGNOSES  Diagnosis: HTN (hypertension)  Assessment and Plan of Treatment: Your bloood pressure is elevated, higher than optimal range. Because  you also have diabetes, we started blood pressure medication lisinopril 10mg daily to prevent kidney damage from diabetes. Please monitor your blood pressure regularly at home after discharge and follow up with your primary care doctor for long term management of hypertension.    Diagnosis: HLD (hyperlipidemia)  Assessment and Plan of Treatment: Your blood cholesterol is elevated, higher than optimal range.  We started lipitor 40mg daily to prevent its damage to your heart and blood vessles. Please follow up with your primary care doctor for long term management of hyperlipidemia.     PRINCIPAL DISCHARGE DIAGNOSIS  Diagnosis: Uncontrolled diabetes mellitus with hyperglycemia  Assessment and Plan of Treatment: You were admitted to hospital for elevated blood glucose and later found to be diagnosed with diabetes mellitus. The level of your blood glucose is critically high that could be life threatening. We treated you with fluids and insulin and monitored your blood glucose, electrolytes, acidity and metabolites frequently. Now your blood glucose is well controlled with an optimized insulin regimen listed below.  Lantus Solostar pen 36 units after the dinner until the bedtime  Humalog (insulin lispro kwikpen) 12 units before meal three times a day  Education on diet, glucometer, and insulin usage were also delivered to you at the bed side. Please make sure you check you blood glucose regulary (3-4 times a day before meal ) and adhere to your insulin injection per instruction. Pleaese follow up with endocrine doctors in 1 week after being discharged.  If you developed feelings of nausea, vomitng, acute adominal pain, and your glucose is high > 250, call your primary care doctor or glucose > 400, go to ED for help.    If you feel heart racing, cold sweating, dizziness, tremor, confusion and your glucose level < 60,  PLEAES GO TO THE ED or CALL 911.      SECONDARY DISCHARGE DIAGNOSES  Diagnosis: HTN (hypertension)  Assessment and Plan of Treatment: Your bloood pressure is elevated, higher than optimal range. Because  you also have diabetes, we started blood pressure medication lisinopril 10mg daily to prevent kidney damage from diabetes. Please monitor your blood pressure regularly at home after discharge and follow up with your primary care doctor for long term management of hypertension.    Diagnosis: HLD (hyperlipidemia)  Assessment and Plan of Treatment: Your blood cholesterol is elevated, higher than optimal range.  We started lipitor 40mg daily to prevent its damage to your heart and blood vessles. Please follow up with your primary care doctor for long term management of hyperlipidemia.     PRINCIPAL DISCHARGE DIAGNOSIS  Diagnosis: Uncontrolled diabetes mellitus with hyperglycemia  Assessment and Plan of Treatment: You were admitted to hospital for elevated blood glucose and later found to be diagnosed with diabetes mellitus. The level of your blood glucose is critically high that could be life threatening. We treated you with fluids and insulin and monitored your blood glucose, electrolytes, acidity and metabolites frequently. Now your blood glucose is well controlled with an optimized insulin regimen listed below.  Lantus Solostar pen 36 units after the dinner until the bedtime  Insulin lispro kwikpen 12 units before meal three times a day  Education on diet, glucometer, and insulin usage were also delivered to you at the bed side. Please make sure you check you blood glucose regulary (4 times a day before meal ) and adhere to your insulin injection per instruction. Pleaese follow up with endocrine doctors in 1 week after being discharged.  If you developed feelings of nausea, vomitng, acute adominal pain, and your glucose is high > 300, call your primary care doctor or glucose > 400, go to ED for help.    If you feel heart racing, cold sweating, dizziness, tremor, confusion and your glucose level < 60,  PLEAES GO TO THE ED or CALL 911.      SECONDARY DISCHARGE DIAGNOSES  Diagnosis: HTN (hypertension)  Assessment and Plan of Treatment: Your bloood pressure is elevated, higher than optimal range. Because  you also have diabetes, we started blood pressure medication lisinopril 10mg daily to prevent kidney damage from diabetes. Please monitor your blood pressure regularly at home after discharge and follow up with your primary care doctor for long term management of hypertension.    Diagnosis: HLD (hyperlipidemia)  Assessment and Plan of Treatment: Your blood cholesterol is elevated, higher than optimal range.  We started lipitor 40mg daily to prevent its damage to your heart and blood vessles. Please follow up with your primary care doctor for long term management of hyperlipidemia.     PRINCIPAL DISCHARGE DIAGNOSIS  Diagnosis: Uncontrolled diabetes mellitus with hyperglycemia  Assessment and Plan of Treatment: You were admitted to hospital for elevated blood glucose and later found to be diagnosed with diabetes mellitus. The level of your blood glucose is critically high that could be life threatening. We treated you with fluids and insulin and monitored your blood glucose, electrolytes, acidity and metabolites frequently. Now your blood glucose is well controlled with an optimized insulin regimen listed below.  Lantus Solostar pen 36 units after the dinner until the bedtime  Insulin lispro kwikpen 12 units before meal three times a day  Education on diet, glucometer, and insulin usage were also delivered to you at the bed side. Please make sure you check you blood glucose regulary (4 times a day before meal ) and adhere to your insulin injection per instruction. Pleaese follow up with endocrine doctors in 1 week after being discharged.  If you developed feelings of nausea, vomitng, acute adominal pain, and your glucose is high > 300, call your primary care doctor or glucose > 400, go to ED for help.    If you feel heart racing, cold sweating, dizziness, tremor, confusion and your glucose level < 60,  PLEAES GO TO THE ED or CALL 911.      SECONDARY DISCHARGE DIAGNOSES  Diagnosis: HTN (hypertension)  Assessment and Plan of Treatment: Your bloood pressure is elevated, higher than optimal range. Because  you also have diabetes, we started blood pressure medication lisinopril 10mg daily to prevent kidney damage from diabetes. Please monitor your blood pressure regularly at home after discharge and follow up with your primary care doctor for long term management of hypertension. Please get a repeat BMP once you see your PCP.    Diagnosis: HLD (hyperlipidemia)  Assessment and Plan of Treatment: Your blood cholesterol is elevated, higher than optimal range.  We started lipitor 40mg daily to prevent its damage to your heart and blood vessles. Please follow up with your primary care doctor for long term management of hyperlipidemia.

## 2022-07-20 NOTE — PROGRESS NOTE ADULT - SUBJECTIVE AND OBJECTIVE BOX
History:  denies n/v.  good appetite.  feeling better.  Amenable to insulin injections.      MEDICATIONS  (STANDING):  atorvastatin 40 milliGRAM(s) Oral at bedtime  dextrose 5%. 1000 milliLiter(s) (100 mL/Hr) IV Continuous <Continuous>  dextrose 5%. 1000 milliLiter(s) (50 mL/Hr) IV Continuous <Continuous>  dextrose 50% Injectable 25 Gram(s) IV Push once  dextrose 50% Injectable 12.5 Gram(s) IV Push once  dextrose 50% Injectable 25 Gram(s) IV Push once  glucagon  Injectable 1 milliGRAM(s) IntraMuscular once  insulin glargine Injectable (LANTUS) 30 Unit(s) SubCutaneous <User Schedule>  insulin lispro (ADMELOG) corrective regimen sliding scale   SubCutaneous three times a day before meals  insulin lispro (ADMELOG) corrective regimen sliding scale   SubCutaneous at bedtime  insulin lispro Injectable (ADMELOG) 8 Unit(s) SubCutaneous three times a day before meals  lactated ringers. 1000 milliLiter(s) (125 mL/Hr) IV Continuous <Continuous>  pantoprazole    Tablet 40 milliGRAM(s) Oral before breakfast    MEDICATIONS  (PRN):  dextrose Oral Gel 15 Gram(s) Oral once PRN Blood Glucose LESS THAN 70 milliGRAM(s)/deciliter      Allergies    No Known Allergies    Intolerances      Review of Systems:  Constitutional: No fever  ALL OTHER SYSTEMS REVIEWED AND NEGATIVE        PHYSICAL EXAM:  VITALS: T(C): 36.7 (07-20-22 @ 12:45)  T(F): 98 (07-20-22 @ 12:45), Max: 98.1 (07-20-22 @ 05:30)  HR: 66 (07-20-22 @ 12:45) (66 - 86)  BP: 157/95 (07-20-22 @ 12:45) (135/95 - 157/95)  RR:  (16 - 17)  SpO2:  (100% - 100%)  Wt(kg): --  GENERAL: NAD, well-developed  GI: Soft, nontender, non distended  SKIN: Dry, intact, No rashes or lesions  PSYCH: Alert and oriented x 3, normal affect, normal mood      CAPILLARY BLOOD GLUCOSE    POCT Blood Glucose.: 307 mg/dL (20 Jul 2022 14:47)  POCT Blood Glucose.: 286 mg/dL (20 Jul 2022 12:24)  POCT Blood Glucose.: 249 mg/dL (20 Jul 2022 10:29)  POCT Blood Glucose.: 288 mg/dL (20 Jul 2022 06:02)  POCT Blood Glucose.: 233 mg/dL (20 Jul 2022 02:15)  POCT Blood Glucose.: 264 mg/dL (19 Jul 2022 22:20)  POCT Blood Glucose.: 341 mg/dL (19 Jul 2022 18:45)  POCT Blood Glucose.: 368 mg/dL (19 Jul 2022 17:08)      07-20    137  |  96<L>  |  12  ----------------------------<  279<H>  4.3   |  27  |  1.16    eGFR: 89    Ca    9.6      07-20  Mg     2.10     07-20  Phos  4.4     07-20    TPro  8.7<H>  /  Alb  4.8  /  TBili  1.0  /  DBili  x   /  AST  43<H>  /  ALT  79<H>  /  AlkPhos  83  07-19          Thyroid Function Tests:  07-20 @ 05:35 TSH 0.76 FreeT4 1.5 T3 -- Anti TPO -- Anti Thyroglobulin Ab -- TSI --      Lipid Profile (07.20.22 @ 05:35)    Cholesterol, Serum: 224 mg/dL    Triglycerides, Serum: 146 mg/dL    HDL Cholesterol, Serum: 24 mg/dL    LDL Cholesterol Calculated: 171 mg/dL      A1C with Estimated Average Glucose Result: 12.8 % (07.19.22 @ 12:33)

## 2022-07-20 NOTE — DIETITIAN INITIAL EVALUATION ADULT - ADD RECOMMEND
1) Continue on Current diet RX: CSTCHO, DASH/TLC  2) Recommend multivitamin once daily for micronutrient provisions  3) Consider Omega 3 supplement once daily   4) continue to Monitor weights, labs, BM's, skin integrity, p.o. intake.   5) Reconsult RD as needed

## 2022-07-21 DIAGNOSIS — E78.5 HYPERLIPIDEMIA, UNSPECIFIED: ICD-10-CM

## 2022-07-21 PROBLEM — K21.9 GASTRO-ESOPHAGEAL REFLUX DISEASE WITHOUT ESOPHAGITIS: Chronic | Status: ACTIVE | Noted: 2022-07-20

## 2022-07-21 LAB
ANION GAP SERPL CALC-SCNC: 13 MMOL/L — SIGNIFICANT CHANGE UP (ref 7–14)
B-OH-BUTYR SERPL-SCNC: 1 MMOL/L — HIGH (ref 0–0.4)
BILIRUB SERPL-MCNC: 0.8 MG/DL — SIGNIFICANT CHANGE UP (ref 0.2–1.2)
BUN SERPL-MCNC: 10 MG/DL — SIGNIFICANT CHANGE UP (ref 7–23)
CALCIUM SERPL-MCNC: 9.3 MG/DL — SIGNIFICANT CHANGE UP (ref 8.4–10.5)
CHLORIDE SERPL-SCNC: 98 MMOL/L — SIGNIFICANT CHANGE UP (ref 98–107)
CO2 SERPL-SCNC: 25 MMOL/L — SIGNIFICANT CHANGE UP (ref 22–31)
CREAT SERPL-MCNC: 0.99 MG/DL — SIGNIFICANT CHANGE UP (ref 0.5–1.3)
EGFR: 107 ML/MIN/1.73M2 — SIGNIFICANT CHANGE UP
GLUCOSE BLDC GLUCOMTR-MCNC: 232 MG/DL — HIGH (ref 70–99)
GLUCOSE BLDC GLUCOMTR-MCNC: 234 MG/DL — HIGH (ref 70–99)
GLUCOSE BLDC GLUCOMTR-MCNC: 244 MG/DL — HIGH (ref 70–99)
GLUCOSE BLDC GLUCOMTR-MCNC: 259 MG/DL — HIGH (ref 70–99)
GLUCOSE BLDC GLUCOMTR-MCNC: 287 MG/DL — HIGH (ref 70–99)
GLUCOSE BLDC GLUCOMTR-MCNC: 358 MG/DL — HIGH (ref 70–99)
GLUCOSE SERPL-MCNC: 274 MG/DL — HIGH (ref 70–99)
MELD SCORE WITH DIALYSIS: SIGNIFICANT CHANGE UP POINTS
MELD SCORE WITHOUT DIALYSIS: SIGNIFICANT CHANGE UP POINTS
POTASSIUM SERPL-MCNC: 4.5 MMOL/L — SIGNIFICANT CHANGE UP (ref 3.5–5.3)
POTASSIUM SERPL-SCNC: 4.5 MMOL/L — SIGNIFICANT CHANGE UP (ref 3.5–5.3)
SODIUM SERPL-SCNC: 136 MMOL/L — SIGNIFICANT CHANGE UP (ref 135–145)

## 2022-07-21 PROCEDURE — 99232 SBSQ HOSP IP/OBS MODERATE 35: CPT | Mod: GC

## 2022-07-21 PROCEDURE — 99232 SBSQ HOSP IP/OBS MODERATE 35: CPT

## 2022-07-21 RX ORDER — INSULIN GLARGINE 100 [IU]/ML
32 INJECTION, SOLUTION SUBCUTANEOUS
Refills: 0 | Status: DISCONTINUED | OUTPATIENT
Start: 2022-07-21 | End: 2022-07-21

## 2022-07-21 RX ORDER — INSULIN GLARGINE 100 [IU]/ML
35 INJECTION, SOLUTION SUBCUTANEOUS
Refills: 0 | Status: DISCONTINUED | OUTPATIENT
Start: 2022-07-22 | End: 2022-07-22

## 2022-07-21 RX ORDER — ATORVASTATIN CALCIUM 80 MG/1
1 TABLET, FILM COATED ORAL
Qty: 0 | Refills: 0 | DISCHARGE
Start: 2022-07-21

## 2022-07-21 RX ORDER — LISINOPRIL 2.5 MG/1
1 TABLET ORAL
Qty: 0 | Refills: 0 | DISCHARGE
Start: 2022-07-21

## 2022-07-21 RX ORDER — INSULIN LISPRO 100/ML
10 VIAL (ML) SUBCUTANEOUS
Refills: 0 | Status: DISCONTINUED | OUTPATIENT
Start: 2022-07-21 | End: 2022-07-21

## 2022-07-21 RX ORDER — INSULIN LISPRO 100/ML
12 VIAL (ML) SUBCUTANEOUS
Refills: 0 | Status: DISCONTINUED | OUTPATIENT
Start: 2022-07-22 | End: 2022-07-22

## 2022-07-21 RX ORDER — ISOPROPYL ALCOHOL, BENZOCAINE .7; .06 ML/ML; ML/ML
1 SWAB TOPICAL
Qty: 120 | Refills: 1
Start: 2022-07-21 | End: 2022-09-18

## 2022-07-21 RX ORDER — LISINOPRIL 2.5 MG/1
10 TABLET ORAL DAILY
Refills: 0 | Status: DISCONTINUED | OUTPATIENT
Start: 2022-07-21 | End: 2022-07-22

## 2022-07-21 RX ORDER — INSULIN GLARGINE 100 [IU]/ML
2 INJECTION, SOLUTION SUBCUTANEOUS ONCE
Refills: 0 | Status: COMPLETED | OUTPATIENT
Start: 2022-07-21 | End: 2022-07-21

## 2022-07-21 RX ORDER — LISINOPRIL 2.5 MG/1
5 TABLET ORAL DAILY
Refills: 0 | Status: DISCONTINUED | OUTPATIENT
Start: 2022-07-21 | End: 2022-07-21

## 2022-07-21 RX ADMIN — Medication 10 UNIT(S): at 18:16

## 2022-07-21 RX ADMIN — INSULIN GLARGINE 2 UNIT(S): 100 INJECTION, SOLUTION SUBCUTANEOUS at 14:24

## 2022-07-21 RX ADMIN — Medication 10 UNIT(S): at 13:15

## 2022-07-21 RX ADMIN — Medication 2: at 08:54

## 2022-07-21 RX ADMIN — Medication 8 UNIT(S): at 08:54

## 2022-07-21 RX ADMIN — ATORVASTATIN CALCIUM 40 MILLIGRAM(S): 80 TABLET, FILM COATED ORAL at 22:26

## 2022-07-21 RX ADMIN — Medication 3: at 12:52

## 2022-07-21 RX ADMIN — Medication 2: at 18:16

## 2022-07-21 RX ADMIN — Medication 1 TABLET(S): at 11:50

## 2022-07-21 RX ADMIN — INSULIN GLARGINE 30 UNIT(S): 100 INJECTION, SOLUTION SUBCUTANEOUS at 12:31

## 2022-07-21 RX ADMIN — PANTOPRAZOLE SODIUM 40 MILLIGRAM(S): 20 TABLET, DELAYED RELEASE ORAL at 06:16

## 2022-07-21 NOTE — PROGRESS NOTE ADULT - SUBJECTIVE AND OBJECTIVE BOX
Chief Complaint: Newly diagnosed diabetes     History: Patient seen at bedside. Reports he is feeling better, eating meals, denies n/v, denies s/s of hypoglycemia  Patient seen by [facility] pharmacist today - reports he feels comfortable with insulin PEN use, reports he understands diet and lifestyle modifications   Medicaid insurance is now active per CM  Patient would like to followup with [facility] for medicine and endocrine    MEDICATIONS  (STANDING):  atorvastatin 40 milliGRAM(s) Oral at bedtime  dextrose 5%. 1000 milliLiter(s) (100 mL/Hr) IV Continuous <Continuous>  dextrose 5%. 1000 milliLiter(s) (50 mL/Hr) IV Continuous <Continuous>  dextrose 50% Injectable 25 Gram(s) IV Push once  dextrose 50% Injectable 12.5 Gram(s) IV Push once  dextrose 50% Injectable 25 Gram(s) IV Push once  glucagon  Injectable 1 milliGRAM(s) IntraMuscular once  insulin glargine Injectable (LANTUS) 32 Unit(s) SubCutaneous <User Schedule>  insulin lispro (ADMELOG) corrective regimen sliding scale   SubCutaneous three times a day before meals  insulin lispro (ADMELOG) corrective regimen sliding scale   SubCutaneous at bedtime  insulin lispro Injectable (ADMELOG) 10 Unit(s) SubCutaneous three times a day before meals  lisinopril 10 milliGRAM(s) Oral daily  multivitamin 1 Tablet(s) Oral daily  pantoprazole    Tablet 40 milliGRAM(s) Oral before breakfast    MEDICATIONS  (PRN):  dextrose Oral Gel 15 Gram(s) Oral once PRN Blood Glucose LESS THAN 70 milliGRAM(s)/deciliter    No Known Allergies    Review of Systems:  HEENT: No pain  Cardiovascular: No chest pain  Respiratory: No SOB  GI: No nausea, vomiting    PHYSICAL EXAM:  VITALS: T(C): 36.7 (07-21-22 @ 05:30)  T(F): 98 (07-21-22 @ 05:30), Max: 98 (07-20-22 @ 21:15)  HR: 83 (07-21-22 @ 05:30) (82 - 83)  BP: 159/85 (07-21-22 @ 05:30) (138/89 - 159/85)  RR:  (18 - 18)  SpO2:  (100% - 100%)  Wt(kg): --  GENERAL: NAD  EYES: No proptosis, no lid lag, anicteric  HEENT:  Atraumatic, Normocephalic, moist mucous membranes  RESPIRATORY: unlabored respirations     CAPILLARY BLOOD GLUCOSE    POCT Blood Glucose.: 234 mg/dL (21 Jul 2022 18:08)  POCT Blood Glucose.: 358 mg/dL (21 Jul 2022 14:23)  POCT Blood Glucose.: 287 mg/dL (21 Jul 2022 11:58)  POCT Blood Glucose.: 232 mg/dL (21 Jul 2022 08:37)  POCT Blood Glucose.: 293 mg/dL (20 Jul 2022 21:14)      07-21    136  |  98  |  10  ----------------------------<  274<H>  4.5   |  25  |  0.99    eGFR: 107    Ca    9.3      07-21  Mg     2.10     07-20  Phos  4.4     07-20    TPro  x   /  Alb  x   /  TBili  0.8  /  DBili  x   /  AST  x   /  ALT  x   /  AlkPhos  x   07-21      Thyroid Function Tests:  07-20 @ 05:35 TSH 0.76 FreeT4 1.5 T3 -- Anti TPO -- Anti Thyroglobulin Ab -- TSI --      A1C with Estimated Average Glucose Result: 12.8 % (07-19-22 @ 12:33)    Diet, Consistent Carbohydrate w/Evening Snack:   DASH/TLC Sodium & Cholesterol Restricted (DASH) (07-20-22 @ 11:05)

## 2022-07-21 NOTE — PROGRESS NOTE ADULT - ASSESSMENT
27M new onset DM presenting with severe hyperglycemia to 700s, mild DKA    1. DKA  Resolved. AG closed. BHB trending down  Hyperglycemia continued, see insulin adjustments below     2. New DM  Need to clarify type of DM - ketosis prone DM2 vs rule out DM1  Check MICHAEL, islet cell and zinc transporter ab, cpeptide   HBA1c 12.8%    While inpatient:  BG target 100-180 mg/dl   Increase Lantus to 35 units SQ daily (1300)  Increase Admelog to 12 units SQ TID before meals (Hold if NPO/not eating meal)  Continue Admelog low dose correctional scale before meals, low dose at bedtime  Consistent carbohydrate diet, RD consult done  Check BG before meals and bedtime  Hypoglycemia protocol   BEDSIDE RN to education patient on how to use glucometer, insulin PEN and hypoglycemia s/s and management. Education completed with [facility] pharmacist today as well (see pharmacy intervention note)     Discharge Plan:  Basal/bolus insulin PENS with dose TBD  Patient has active MEDICAID insurance -  Preferred Insulins by Medicaid: Lantus Solostar and Insulin Lispro KwikPen  Referred to transitions of care pharmacist, prior authorization attained for Contour Next test strips (qty: 100, day supply 25) good through 7/21/23, REF# [id number].  Please ensure patient has prescription for glucometer, glucose test strips, lancets, alcohol swabs and insulin PEN needles   Follow up [facility]: [phone] [facility], [phone]  1. Medicine Appointment: July 27, 2022 at 1:30 PM  2. Endocrine Appointment: Will email clinic for scheduling - TBD     3. HTN  BP goal < 130/80  Remains elevated, on Lisinopril  Management per primary team    4. HLD  LDL target less than 70  Continue Atorvastatin 40 mg daily    5. Thyromegaly, nontender  TSH, FT4 within target range    Meera Graham  Nurse Practitioner  Division of Endocrinology & Diabetes  In house pager #[phone]/long range pager #[phone]    If before 9AM or after 6PM, or on weekends/holidays, please call endocrine answering service for assistance ([phone]).  For nonurgent matters email [email or web address] for assistance.  27M new onset DM presenting with severe hyperglycemia to 700s, mild DKA    1. DKA  Resolved. AG closed. BHB trending down  Hyperglycemia continued, see insulin adjustments below     2. New DM  Need to clarify type of DM - ketosis prone DM2 vs rule out DM1  Check MICHAEL, islet cell and zinc transporter ab, cpeptide   HBA1c 12.8%    While inpatient:  BG target 100-180 mg/dl   Increase Lantus to 35 units SQ daily (1300)  Increase Admelog to 12 units SQ TID before meals (Hold if NPO/not eating meal)  Continue Admelog low dose correctional scale before meals, low dose at bedtime  Consistent carbohydrate diet, RD consult done  Check BG before meals and bedtime  Hypoglycemia protocol   BEDSIDE RN to education patient on how to use glucometer, insulin PEN and hypoglycemia s/s and management. Education completed with [facility] pharmacist today as well (see pharmacy intervention note)     Discharge Plan:  Basal/bolus insulin PENS with dose TBD  Patient has active MEDICAID insurance -  Preferred Insulins by Medicaid: Lantus Solostar and Insulin Lispro KwikPen  Referred to transitions of care pharmacist, prior authorization attained for Contour Next test strips (qty: 100, day supply 25) good through 7/21/23, REF# [id number].  Please ensure patient has prescription for glucometer, glucose test strips, lancets, alcohol swabs and insulin PEN needles   Follow up [facility]: [phone] [facility], [phone]  1. Medicine Appointment: July 27, 2022 at 1:30 PM  2. Endocrine Appointment: Will email clinic for scheduling - TBD     3. HTN  BP goal < 130/80  Remains elevated, on Lisinopril  Management per primary team    4. HLD  LDL target less than 70   mg/dl  Agree with starting Atorvastatin 40 mg daily    5. Thyromegaly, nontender  TSH, FT4 within target range    Meera Graham  Nurse Practitioner  Division of Endocrinology & Diabetes  In house pager #[phone]/long range pager #[phone]    If before 9AM or after 6PM, or on weekends/holidays, please call endocrine answering service for assistance ([phone]).  For nonurgent matters email [email or web address] for assistance.

## 2022-07-21 NOTE — PROGRESS NOTE ADULT - PROBLEM SELECTOR PLAN 2
- Newly diagnosed with A1C 12.8 and serum glucose 720, beta hydroxy mildly elevated to 1.2, serum lactate mildly elevated to 3. Potassium 5.3.  - Etiology: multifactorial with strong family history of DM and HLD and heavy use of juice and soda since childhood. Have not seen PCP due to insurance issue since age of 17.   - IL IVF x1 given at the ED. Another IVF given on the floor due to hypovolemia from NPO and polyuria.  - Lantus 23 units given at the ED and started on moderate insulin sliding scale and FS Q4, will continue Lantus 23 units daily  - Will monitor his BMP, VBG, lactate, and beta hydroxy frequently  - FS glucose steadily improving down to 300s.   - Adjust insulin regimen based on labs  - Follow endo recs to look into type:      check MICHAEL, islet cell and zinc transporter ab     check c-peptide as outpatient when glucose toxicity is reduced  - AG < 15, BHB 0.7 <1, pt is started on DASH diet, D5LR DCed 07/20  - Currently on lantus 30 units daily, amelog 8 units TID premeal, low dose sliding scale 07/20 - Newly diagnosed with A1C 12.8 and serum glucose 720, beta hydroxy mildly elevated to 1.2, serum lactate mildly elevated to 3. Potassium 5.3.  - Etiology: multifactorial with strong family history of DM and HLD and heavy use of juice and soda since childhood. Have not seen PCP due to insurance issue since age of 17.   - IL IVF x1 given at the ED. Another IVF given on the floor due to hypovolemia from NPO and polyuria.  - Lantus 23 units given at the ED and started on moderate insulin sliding scale and FS Q4, will continue Lantus 23 units daily  - Will monitor his BMP, VBG, lactate, and beta hydroxy frequently  - FS glucose steadily improving down to 300s.   - Adjust insulin regimen based on labs  - Follow endo recs to look into type:      check MICHAEL, islet cell and zinc transporter ab     check c-peptide as outpatient when glucose toxicity is reduced  - AG < 15, BHB 0.7 <1, pt is started on DASH diet, D5LR DCed 07/20  - Currently on lantus 32 units daily, amelog 10 units TID premeal, moderate sliding scale 07/21

## 2022-07-21 NOTE — PROGRESS NOTE ADULT - PROBLEM SELECTOR PLAN 3
- low level HDL, HTN, DM  - Strong family history of DM and HLD  - Start atorvastatin 40mg   - May start ace-i tomorrow

## 2022-07-21 NOTE — PHARMACOTHERAPY INTERVENTION NOTE - COMMENTS
Informed by , pt's Medicaid may be active in the next couple days. If patient is uninsured prior to discharge, can use free basal/bolus coupons (Tresiba and Novolog Pens). Pt educated on A1c, basal/bolus insulin pen administration, hypoglycemia and treatment, healthy plate, exercise, goal BG, basics of using a glucometer, and when to check BG, pt with good return demo and verbalized understanding. Counseled pt on where to inject insulin (abdomen, outer thighs), rotating injection site, proper insulin storage, and sharps disposal. Pt is motivated to make changes to his diet which was high in sugar (juices, sports drinks) and carbs (bagels, white pasta/rice/bread, plantains, mangoes, bananas, waffles, etc.) - spoke about alternative options, he is interested in water/seltzer water, changing to whole wheat bread/pasta/rice, counting his carbohydrates (previously taught by RD), protein waffle/pancake mix. Pt encouraged for outpt f/u with medicine (appt set at [facility] [place]) and endocrine.

## 2022-07-21 NOTE — PROGRESS NOTE ADULT - SUBJECTIVE AND OBJECTIVE BOX
PROGRESS NOTE:       Patient is a 27y old  Male who presents with a chief complaint of nausea/vomiting (2022 16:42)      SUBJECTIVE / OVERNIGHT EVENTS:  NAEON    ADDITIONAL REVIEW OF SYSTEMS:    MEDICATIONS  (STANDING):  atorvastatin 40 milliGRAM(s) Oral at bedtime  dextrose 5%. 1000 milliLiter(s) (100 mL/Hr) IV Continuous <Continuous>  dextrose 5%. 1000 milliLiter(s) (50 mL/Hr) IV Continuous <Continuous>  dextrose 50% Injectable 25 Gram(s) IV Push once  dextrose 50% Injectable 12.5 Gram(s) IV Push once  dextrose 50% Injectable 25 Gram(s) IV Push once  glucagon  Injectable 1 milliGRAM(s) IntraMuscular once  insulin glargine Injectable (LANTUS) 30 Unit(s) SubCutaneous <User Schedule>  insulin lispro (ADMELOG) corrective regimen sliding scale   SubCutaneous three times a day before meals  insulin lispro (ADMELOG) corrective regimen sliding scale   SubCutaneous at bedtime  insulin lispro Injectable (ADMELOG) 8 Unit(s) SubCutaneous three times a day before meals  multivitamin 1 Tablet(s) Oral daily  pantoprazole    Tablet 40 milliGRAM(s) Oral before breakfast    MEDICATIONS  (PRN):  dextrose Oral Gel 15 Gram(s) Oral once PRN Blood Glucose LESS THAN 70 milliGRAM(s)/deciliter      CAPILLARY BLOOD GLUCOSE      POCT Blood Glucose.: 293 mg/dL (2022 21:14)  POCT Blood Glucose.: 227 mg/dL (2022 17:36)  POCT Blood Glucose.: 307 mg/dL (2022 14:47)  POCT Blood Glucose.: 286 mg/dL (2022 12:24)  POCT Blood Glucose.: 249 mg/dL (2022 10:29)    I&O's Summary      PHYSICAL EXAM:  Vital Signs Last 24 Hrs  T(C): 36.7 (2022 05:30), Max: 36.7 (2022 12:45)  T(F): 98 (2022 05:30), Max: 98 (2022 12:45)  HR: 83 (2022 05:30) (66 - 83)  BP: 159/85 (2022 05:30) (138/89 - 159/85)  BP(mean): --  RR: 18 (2022 05:30) (17 - 18)  SpO2: 100% (2022 05:30) (100% - 100%)    Parameters below as of 2022 05:30  Patient On (Oxygen Delivery Method): room air        GENERAL: No acute distress, well-developed  HEAD:  Atraumatic, Normocephalic  EYES: EOMI, PERRLA, conjunctiva and sclera clear  NECK: Supple, no lymphadenopathy, no JVD  CHEST/LUNG: CTAB; No wheezes, rales, or rhonchi  HEART: Regular rate and rhythm; No murmurs, rubs, or gallops  ABDOMEN: Soft, non-tender, non-distended; normal bowel sounds, no organomegaly  EXTREMITIES:  2+ peripheral pulses b/l, No clubbing, cyanosis, or edema  NEUROLOGY: A&O x 3, no focal deficits  SKIN: No rashes or lesions    LABS:                        16.3   5.18  )-----------( 336      ( 2022 09:23 )             46.0     07-20    137  |  96<L>  |  12  ----------------------------<  279<H>  4.3   |  27  |  1.16    Ca    9.6      2022 05:35  Phos  4.4     07-20  Mg     2.10     07-20    TPro  8.7<H>  /  Alb  4.8  /  TBili  1.0  /  DBili  x   /  AST  43<H>  /  ALT  79<H>  /  AlkPhos  83  07-19          Urinalysis Basic - ( 2022 09:23 )    Color: Light Yellow / Appearance: Clear / S.029 / pH: x  Gluc: x / Ketone: Small  / Bili: Negative / Urobili: <2 mg/dL   Blood: x / Protein: Negative / Nitrite: Negative   Leuk Esterase: Negative / RBC: 0 /HPF / WBC 1 /HPF   Sq Epi: x / Non Sq Epi: 0 /HPF / Bacteria: x          RADIOLOGY & ADDITIONAL TESTS:  Results Reviewed:   Imaging Personally Reviewed:  Electrocardiogram Personally Reviewed:    COORDINATION OF CARE:  Care Discussed with Consultants/Other Providers [Y/N]:  Prior or Outpatient Records Reviewed [Y/N]:

## 2022-07-22 ENCOUNTER — TRANSCRIPTION ENCOUNTER (OUTPATIENT)
Age: 27
End: 2022-07-22

## 2022-07-22 VITALS
HEART RATE: 85 BPM | OXYGEN SATURATION: 100 % | DIASTOLIC BLOOD PRESSURE: 94 MMHG | SYSTOLIC BLOOD PRESSURE: 134 MMHG | TEMPERATURE: 98 F | RESPIRATION RATE: 18 BRPM

## 2022-07-22 DIAGNOSIS — E11.65 TYPE 2 DIABETES MELLITUS WITH HYPERGLYCEMIA: ICD-10-CM

## 2022-07-22 PROBLEM — Z00.00 ENCOUNTER FOR PREVENTIVE HEALTH EXAMINATION: Status: ACTIVE | Noted: 2022-07-22

## 2022-07-22 LAB
ANION GAP SERPL CALC-SCNC: 9 MMOL/L — SIGNIFICANT CHANGE UP (ref 7–14)
B-OH-BUTYR SERPL-SCNC: 0.3 MMOL/L — SIGNIFICANT CHANGE UP (ref 0–0.4)
BUN SERPL-MCNC: 10 MG/DL — SIGNIFICANT CHANGE UP (ref 7–23)
C PEPTIDE SERPL-MCNC: 2.2 NG/ML — SIGNIFICANT CHANGE UP (ref 1.1–4.4)
CALCIUM SERPL-MCNC: 9 MG/DL — SIGNIFICANT CHANGE UP (ref 8.4–10.5)
CHLORIDE SERPL-SCNC: 102 MMOL/L — SIGNIFICANT CHANGE UP (ref 98–107)
CO2 SERPL-SCNC: 26 MMOL/L — SIGNIFICANT CHANGE UP (ref 22–31)
CREAT SERPL-MCNC: 0.94 MG/DL — SIGNIFICANT CHANGE UP (ref 0.5–1.3)
EGFR: 114 ML/MIN/1.73M2 — SIGNIFICANT CHANGE UP
GLUCOSE BLDC GLUCOMTR-MCNC: 252 MG/DL — HIGH (ref 70–99)
GLUCOSE BLDC GLUCOMTR-MCNC: 258 MG/DL — HIGH (ref 70–99)
GLUCOSE SERPL-MCNC: 272 MG/DL — HIGH (ref 70–99)
MAGNESIUM SERPL-MCNC: 1.9 MG/DL — SIGNIFICANT CHANGE UP (ref 1.6–2.6)
PHOSPHATE SERPL-MCNC: 3.8 MG/DL — SIGNIFICANT CHANGE UP (ref 2.5–4.5)
POTASSIUM SERPL-MCNC: 4.5 MMOL/L — SIGNIFICANT CHANGE UP (ref 3.5–5.3)
POTASSIUM SERPL-SCNC: 4.5 MMOL/L — SIGNIFICANT CHANGE UP (ref 3.5–5.3)
SODIUM SERPL-SCNC: 137 MMOL/L — SIGNIFICANT CHANGE UP (ref 135–145)

## 2022-07-22 PROCEDURE — 99232 SBSQ HOSP IP/OBS MODERATE 35: CPT

## 2022-07-22 PROCEDURE — 99239 HOSP IP/OBS DSCHRG MGMT >30: CPT | Mod: GC

## 2022-07-22 RX ORDER — INSULIN LISPRO 100/ML
12 VIAL (ML) SUBCUTANEOUS
Qty: 11 | Refills: 0
Start: 2022-07-22 | End: 2022-08-20

## 2022-07-22 RX ORDER — ENOXAPARIN SODIUM 100 MG/ML
36 INJECTION SUBCUTANEOUS
Qty: 11 | Refills: 0
Start: 2022-07-22 | End: 2022-08-20

## 2022-07-22 RX ADMIN — Medication 12 UNIT(S): at 08:55

## 2022-07-22 RX ADMIN — LISINOPRIL 10 MILLIGRAM(S): 2.5 TABLET ORAL at 06:18

## 2022-07-22 RX ADMIN — Medication 12 UNIT(S): at 12:59

## 2022-07-22 RX ADMIN — PANTOPRAZOLE SODIUM 40 MILLIGRAM(S): 20 TABLET, DELAYED RELEASE ORAL at 06:18

## 2022-07-22 RX ADMIN — INSULIN GLARGINE 35 UNIT(S): 100 INJECTION, SOLUTION SUBCUTANEOUS at 12:58

## 2022-07-22 RX ADMIN — Medication 3: at 08:55

## 2022-07-22 RX ADMIN — Medication 3: at 12:59

## 2022-07-22 RX ADMIN — Medication 1 TABLET(S): at 13:00

## 2022-07-22 NOTE — DISCHARGE NOTE NURSING/CASE MANAGEMENT/SOCIAL WORK - DATE OF LAST VACCINATION
18-Nov-2021 Hydroquinone Counseling:  Patient advised that medication may result in skin irritation, lightening (hypopigmentation), dryness, and burning.  In the event of skin irritation, the patient was advised to reduce the amount of the drug applied or use it less frequently.  Rarely, spots that are treated with hydroquinone can become darker (pseudoochronosis).  Should this occur, patient instructed to stop medication and call the office. The patient verbalized understanding of the proper use and possible adverse effects of hydroquinone.  All of the patient's questions and concerns were addressed.

## 2022-07-22 NOTE — PROGRESS NOTE ADULT - PROBLEM SELECTOR PROBLEM 1
Diabetes mellitus, new onset
Hyperosmolar hyperglycemic state (HHS)
Hyperosmolar hyperglycemic state (HHS)
Diabetes mellitus, new onset
DM (diabetes mellitus)
Hyperosmolar hyperglycemic state (HHS)

## 2022-07-22 NOTE — PROGRESS NOTE ADULT - SUBJECTIVE AND OBJECTIVE BOX
Chief Complaint: Newly diagnosed DM    History: Patient seen at bedside. Reports he is eating meals, denies n/v, denies s/s of hypoglycemia    MEDICATIONS  (STANDING):  atorvastatin 40 milliGRAM(s) Oral at bedtime  dextrose 5%. 1000 milliLiter(s) (100 mL/Hr) IV Continuous <Continuous>  dextrose 5%. 1000 milliLiter(s) (50 mL/Hr) IV Continuous <Continuous>  dextrose 50% Injectable 25 Gram(s) IV Push once  dextrose 50% Injectable 12.5 Gram(s) IV Push once  dextrose 50% Injectable 25 Gram(s) IV Push once  glucagon  Injectable 1 milliGRAM(s) IntraMuscular once  insulin glargine Injectable (LANTUS) 35 Unit(s) SubCutaneous <User Schedule>  insulin lispro (ADMELOG) corrective regimen sliding scale   SubCutaneous three times a day before meals  insulin lispro (ADMELOG) corrective regimen sliding scale   SubCutaneous at bedtime  insulin lispro Injectable (ADMELOG) 12 Unit(s) SubCutaneous three times a day before meals  lisinopril 10 milliGRAM(s) Oral daily  multivitamin 1 Tablet(s) Oral daily  pantoprazole    Tablet 40 milliGRAM(s) Oral before breakfast    MEDICATIONS  (PRN):  dextrose Oral Gel 15 Gram(s) Oral once PRN Blood Glucose LESS THAN 70 milliGRAM(s)/deciliter    No Known Allergies    Review of Systems:  Cardiovascular: No chest pain  Respiratory: No SOB  GI: No nausea, vomiting  Endocrine: no hypoglycemia     PHYSICAL EXAM:  VITALS: T(C): 36.4 (07-22-22 @ 13:20)  T(F): 97.6 (07-22-22 @ 13:20), Max: 98 (07-22-22 @ 05:05)  HR: 85 (07-22-22 @ 13:20) (79 - 85)  BP: 134/94 (07-22-22 @ 13:20) (134/94 - 143/70)  RR:  (18 - 18)  SpO2:  (98% - 100%)  Wt(kg): --  GENERAL: NAD  EYES: No proptosis, no lid lag, anicteric  HEENT:  Atraumatic, Normocephalic, moist mucous membranes  RESPIRATORY: unlabored respirations     CAPILLARY BLOOD GLUCOSE    POCT Blood Glucose.: 258 mg/dL (22 Jul 2022 12:05)  POCT Blood Glucose.: 252 mg/dL (22 Jul 2022 08:28)  POCT Blood Glucose.: 244 mg/dL (21 Jul 2022 22:31)  POCT Blood Glucose.: 259 mg/dL (21 Jul 2022 21:21)  POCT Blood Glucose.: 234 mg/dL (21 Jul 2022 18:08)      07-22    137  |  102  |  10  ----------------------------<  272<H>  4.5   |  26  |  0.94    eGFR: 114    Ca    9.0      07-22  Mg     1.90     07-22  Phos  3.8     07-22    TPro  x   /  Alb  x   /  TBili  0.8  /  DBili  x   /  AST  x   /  ALT  x   /  AlkPhos  x   07-21      Thyroid Function Tests:  07-20 @ 05:35 TSH 0.76 FreeT4 1.5 T3 -- Anti TPO -- Anti Thyroglobulin Ab -- TSI --      A1C with Estimated Average Glucose Result: 12.8 % (07-19-22 @ 12:33)    Diet, Consistent Carbohydrate w/Evening Snack:   DASH/TLC Sodium & Cholesterol Restricted (DASH) (07-20-22 @ 11:05)     Chief Complaint: Newly diagnosed DM    History: Patient seen at bedside. Reports he is eating meals, denies n/v, denies s/s of hypoglycemia  Plan for discharge today     MEDICATIONS  (STANDING):  atorvastatin 40 milliGRAM(s) Oral at bedtime  dextrose 5%. 1000 milliLiter(s) (100 mL/Hr) IV Continuous <Continuous>  dextrose 5%. 1000 milliLiter(s) (50 mL/Hr) IV Continuous <Continuous>  dextrose 50% Injectable 25 Gram(s) IV Push once  dextrose 50% Injectable 12.5 Gram(s) IV Push once  dextrose 50% Injectable 25 Gram(s) IV Push once  glucagon  Injectable 1 milliGRAM(s) IntraMuscular once  insulin glargine Injectable (LANTUS) 35 Unit(s) SubCutaneous <User Schedule>  insulin lispro (ADMELOG) corrective regimen sliding scale   SubCutaneous three times a day before meals  insulin lispro (ADMELOG) corrective regimen sliding scale   SubCutaneous at bedtime  insulin lispro Injectable (ADMELOG) 12 Unit(s) SubCutaneous three times a day before meals  lisinopril 10 milliGRAM(s) Oral daily  multivitamin 1 Tablet(s) Oral daily  pantoprazole    Tablet 40 milliGRAM(s) Oral before breakfast    MEDICATIONS  (PRN):  dextrose Oral Gel 15 Gram(s) Oral once PRN Blood Glucose LESS THAN 70 milliGRAM(s)/deciliter    No Known Allergies    Review of Systems:  Cardiovascular: No chest pain  Respiratory: No SOB  GI: No nausea, vomiting  Endocrine: no hypoglycemia     PHYSICAL EXAM:  VITALS: T(C): 36.4 (07-22-22 @ 13:20)  T(F): 97.6 (07-22-22 @ 13:20), Max: 98 (07-22-22 @ 05:05)  HR: 85 (07-22-22 @ 13:20) (79 - 85)  BP: 134/94 (07-22-22 @ 13:20) (134/94 - 143/70)  RR:  (18 - 18)  SpO2:  (98% - 100%)  Wt(kg): --  GENERAL: NAD  EYES: No proptosis, no lid lag, anicteric  HEENT:  Atraumatic, Normocephalic, moist mucous membranes  RESPIRATORY: unlabored respirations     CAPILLARY BLOOD GLUCOSE    POCT Blood Glucose.: 258 mg/dL (22 Jul 2022 12:05)  POCT Blood Glucose.: 252 mg/dL (22 Jul 2022 08:28)  POCT Blood Glucose.: 244 mg/dL (21 Jul 2022 22:31)  POCT Blood Glucose.: 259 mg/dL (21 Jul 2022 21:21)  POCT Blood Glucose.: 234 mg/dL (21 Jul 2022 18:08)      07-22    137  |  102  |  10  ----------------------------<  272<H>  4.5   |  26  |  0.94    eGFR: 114    Ca    9.0      07-22  Mg     1.90     07-22  Phos  3.8     07-22    TPro  x   /  Alb  x   /  TBili  0.8  /  DBili  x   /  AST  x   /  ALT  x   /  AlkPhos  x   07-21      Thyroid Function Tests:  07-20 @ 05:35 TSH 0.76 FreeT4 1.5 T3 -- Anti TPO -- Anti Thyroglobulin Ab -- TSI --      A1C with Estimated Average Glucose Result: 12.8 % (07-19-22 @ 12:33)    Diet, Consistent Carbohydrate w/Evening Snack:   DASH/TLC Sodium & Cholesterol Restricted (DASH) (07-20-22 @ 11:05)

## 2022-07-22 NOTE — PROGRESS NOTE ADULT - PROBLEM SELECTOR PLAN 2
- Newly diagnosed with A1C 12.8 and serum glucose 720, beta hydroxy mildly elevated to 1.2, serum lactate mildly elevated to 3. Potassium 5.3.  - Etiology: multifactorial with strong family history of DM and HLD and heavy use of juice and soda since childhood. Have not seen PCP due to insurance issue since age of 17.   - IL IVF x1 given at the ED. Another IVF given on the floor due to hypovolemia from NPO and polyuria.  - Lantus 23 units given at the ED and started on moderate insulin sliding scale and FS Q4, will continue Lantus 23 units daily  - Will monitor his BMP, VBG, lactate, and beta hydroxy frequently  - FS glucose steadily improving down to 300s.   - Adjust insulin regimen based on labs  - Follow endo recs to look into type:      check MICHAEL, islet cell and zinc transporter ab     check c-peptide as outpatient when glucose toxicity is reduced  - AG < 15, BHB 0.7 <1, pt is started on DASH diet, D5LR DCed 07/20  - Currently on lantus 32 units daily, amelog 10 units TID premeal, moderate sliding scale 07/21 - Newly diagnosed with A1C 12.8 and serum glucose 720, beta hydroxy mildly elevated to 1.2, serum lactate mildly elevated to 3. Potassium 5.3.  - Etiology: multifactorial with strong family history of DM and HLD and heavy use of juice and soda since childhood. Have not seen PCP due to insurance issue since age of 17.   - IL IVF x1 given at the ED. Another IVF given on the floor due to hypovolemia from NPO and polyuria.  - Lantus 23 units given at the ED and started on moderate insulin sliding scale and FS Q4, will continue Lantus 23 units daily  - Will monitor his BMP, VBG, lactate, and beta hydroxy frequently  - FS glucose steadily improving down to 300s.   - Adjust insulin regimen based on labs  - Follow endo recs to look into type:      check MICHAEL, islet cell and zinc transporter ab     check c-peptide as outpatient when glucose toxicity is reduced  - AG < 15, BHB 0.7 <1, pt is started on DASH diet, D5LR DCed 07/20  - Currently on lantus 35 units daily, amelog 12 units TID premeal, moderate sliding scale 07/21

## 2022-07-22 NOTE — PROGRESS NOTE ADULT - PROBLEM SELECTOR PLAN 1
- Serum glucose level initially presented at the level of 700s, > 500 mg/dl  - Increased effective plasma osmolality >320 mOsm/kg (321)in the absence of ketoacidosis (minimal beta hydroxy, normal PH although anion gap 19)  - The goal to treat HHS is to fluid resuscitation, electrolytes replenishment and glucose control   - Please refer insulin regimen and fluid/electrolytes resuscitation described below

## 2022-07-22 NOTE — DISCHARGE NOTE NURSING/CASE MANAGEMENT/SOCIAL WORK - NSDCPEFALRISK_GEN_ALL_CORE
For information on Fall & Injury Prevention, visit: [email or web address] OR  [email or web address] OR  https://www.cdc.gov/steadi/patient.html

## 2022-07-22 NOTE — PROGRESS NOTE ADULT - PROBLEM SELECTOR PLAN 3
- low level HDL, HTN, DM  - Strong family history of DM and HLD  - Start atorvastatin 40mg   - May start ace-i tomorrow - low level HDL, HTN, DM  - Strong family history of DM and HLD  - Start atorvastatin 40mg daily 07/21  - Start lisinopril 10mg daily 07/22

## 2022-07-22 NOTE — PROGRESS NOTE ADULT - ASSESSMENT
27M new onset DM presenting with severe hyperglycemia to 700s, mild DKA    1. DKA  Resolved. AG closed. BHB trending down    2. New DM  Need to clarify type of DM - DM2 vs rule out DM1  Check MICHAEL, islet cell and zinc transporter ab - pending  cpeptide 2.2 although hyperglycemia at time of blood draw  HBA1c 12.8%    While inpatient:  BG target 100-180 mg/dl   Continue Lantus 35 units SQ daily (1300)  Continue Admelog 12 units SQ TID before meals (Hold if NPO/not eating meal)  Continue Admelog low dose correctional scale before meals, low dose at bedtime  Consistent carbohydrate diet, RD consult done  Check BG before meals and bedtime  Hypoglycemia protocol   BEDSIDE RN to education patient on how to use glucometer, insulin PEN and hypoglycemia s/s and management. Education completed with [facility] pharmacist 7/21 as well (see pharmacy intervention note)     Discharge Plan:  Basal/bolus insulin PENS: Recommend Lantus Solostar PEN 36 units SQ daily at 1 PM (patient can move gradually later if he prefers, would recommend 2 hours later per day until desired time is reached then q24h), plus Insulin lispro KwikPEN 12 units SQ TID before meals (Hold if skips meal)  Patient has active MEDICAID insurance -  Preferred Insulins by Medicaid: Lantus Solostar and Insulin Lispro KwikPen  Referred to transitions of care pharmacist, prior authorization attained for Contour Next test strips (qty: 100, day supply 25) good through 7/21/23, REF# [id number].  Please ensure patient has prescription for glucometer, glucose test strips, lancets, alcohol swabs and insulin PEN needles   Follow up [facility]: [phone] [facility], [phone]  1. Medicine Appointment: July 27, 2022 at 1:30 PM  2. Endocrine Appointment: Emailed clinic for scheduling    3. HTN  BP goal < 130/80  Remains elevated, on Lisinopril  Management per primary team    4. HLD  LDL target less than 70   mg/dl  Agree with starting Atorvastatin 40 mg daily    5. Thyromegaly, nontender  TSH, FT4 within target range    Meera Graham  Nurse Practitioner  Division of Endocrinology & Diabetes  In house pager #[phone]/long range pager #[phone]    If before 9AM or after 6PM, or on weekends/holidays, please call endocrine answering service for assistance ([phone]).  For nonurgent matters email [email or web address] for assistance.  27M new onset DM presenting with severe hyperglycemia to 700s, mild DKA    1. DKA  Resolved. AG closed. BHB trending down    2. New DM  Need to clarify type of DM - DM2 vs rule out DM1  Check MICHAEL, islet cell and zinc transporter ab - pending  cpeptide 2.2 although hyperglycemia at time of blood draw  HBA1c 12.8%    While inpatient:  BG target 100-180 mg/dl   Continue Lantus 35 units SQ daily (1300)  Continue Admelog 12 units SQ TID before meals (Hold if NPO/not eating meal)  Continue Admelog low dose correctional scale before meals, low dose at bedtime  Consistent carbohydrate diet, RD consult done  Check BG before meals and bedtime  Hypoglycemia protocol   BEDSIDE RN to education patient on how to use glucometer, insulin PEN and hypoglycemia s/s and management. Education completed with [facility] pharmacist 7/21 as well (see pharmacy intervention note)     Discharge Plan:  Basal/bolus insulin PENS: Recommend Lantus Solostar PEN 36 units SQ daily at 1 PM (patient can move gradually later if he prefers, would recommend 2 hours later per day until desired time is reached then q24h), plus Insulin lispro KwikPEN 12 units SQ TID before meals (Hold if skips meal)  Patient has active MEDICAID insurance -  Preferred Insulins by Medicaid: Lantus Solostar and Insulin Lispro KwikPen  Referred to transitions of care pharmacist, prior authorization attained for Contour Next test strips (qty: 100, day supply 25) good through 7/21/23, REF# [id number].  Please ensure patient has prescription for glucometer, glucose test strips, lancets, alcohol swabs and insulin PEN needles   Follow up [facility]: [phone] [facility], [phone]  1. Medicine Appointment: July 27, 2022 at 1:30 PM  2. Endocrine Appointment: Emailed clinic for scheduling  Addendum: Endocrine appointment scheduled for 11/8/22 at 9:20 AM    3. HTN  BP goal < 130/80  Remains elevated, on Lisinopril  Management per primary team    4. HLD  LDL target less than 70   mg/dl  Agree with starting Atorvastatin 40 mg daily    5. Thyromegaly, nontender  TSH, FT4 within target range    Meera Graham  Nurse Practitioner  Division of Endocrinology & Diabetes  In house pager #[phone]/long range pager #[phone]    If before 9AM or after 6PM, or on weekends/holidays, please call endocrine answering service for assistance ([phone]).  For nonurgent matters email [email or web address] for assistance.

## 2022-07-22 NOTE — PROGRESS NOTE ADULT - PROBLEM SELECTOR PLAN 6
- Ordered TSH, lipid panel to look into metabolic syndrome   - NO DVT prophylaxis is indicated as pt is able to ambulate  - NPO now - Ordered TSH, lipid panel to look into metabolic syndrome   - NO DVT prophylaxis is indicated as pt is able to ambulate  - DASH diet

## 2022-07-22 NOTE — PROGRESS NOTE ADULT - ATTENDING COMMENTS
Patient seen and examined at bedside. In brief, 27 year old man with history of GERD, presenting with nausea, vomiting, and inability to tolerate PO for around two weeks.  Pt found to be hyperglycemic >700, with AG of 17, A1c of 12.8, consistent with HHS vs. DKA given AG, however, elevate FS, and low beta hydroxybuterate and new diabetes   #New DM A1c 12.8   - Continue basal bolus; -> increase to lantus 32 and 10 pre meal; f/u endocrine final discharge recs   -continue  consistent carb diet, appreciate dietician   - s/p IVF bolus,  - F/U lipid panel - Lipid panel >200, will start statin    - Needs insulin teaching on discharge - appreciate teaching by pharmacist on 7/21   - patient without insure, will work with pharmacist in securing medication (spectra [id number])  - Endocrine consult, appreciate recs   #Elevated creatine - patient's creatine on admission was 1.35 likely pre-renal   - Continue IVF   - CTM   #GERD  - Continue PPI  #Elevated blood pressure  - Started lisinoprol 10mg   - Repeat BMP in 1-2 weeks to monitor electrolytes     Rest of plan as above .  Dispo: home with new insulin 37 minutes spent on discharge
Patient seen and examined at bedside. In brief, 27 year old man with history of GERD, presenting with nausea, vomiting, and inability to tolerate PO for around two weeks.  Pt found to be hyperglycemic >700, with AG of 17, A1c of 12.8, consistent with HHS vs. DKA given AG, however, elevate FS, and low beta hydroxybuterate and new diabetes   #New DM A1c 12.8   - Continue basal bolus; -> increased to lantus 35 and 12 pre meal; f/u endocrine final discharge recs   -continue  consistent carb diet, appreciate dietician   - s/p IVF bolus,  - F/U lipid panel - Lipid panel >200, will continue  statin    - Needs insulin teaching on discharge - appreciate teaching by pharmacist on 7/21 and 7/22   - patient without insure, will work with pharmacist in securing medication (spectra [id number])  - Endocrine consult, appreciate recs   - patient has endocrine appointment next week   #Elevated creatine - patient's creatine on admission was 1.35 likely pre-renal   - Continue IVF   - CTM   #GERD  - Continue PPI  #Elevated blood pressure  - Started lisinoprol 10mg   - Repeat BMP in 1-2 weeks to monitor electrolytes     Rest of plan as above .  Dispo: home with new insulin 37 minutes spent on discharge
Patient seen and examined at bedside. In brief, 27 year old man with history of GERD, presenting with nausea, vomiting, and inability to tolerate PO for around two weeks.  Pt found to be hyperglycemic >700, with AG of 17, A1c of 12.8, consistent with HHS vs. DKA given AG, however, elevate FS, and low beta hydroxybuterate and new diabetes   #New DM A1c 12.8   - Will Started on weight based dosing needs total of 57 units of lantus with basal bolus;   - Start consistent carb diet, will dc IVF once patient is eating   - s/p IVF bolus, on maintaince fluids; add D5 to fluids once FS < 250s  - F/U lipid panel - Lipid panel >200, will start statin    - Needs insulin teaching on discharge   - patient without insure, will work with pharmacist in securing medication ([id number])  - Endocrine consult, appreciate recs   #Elevated creatine - patient's creatine on admission was 1.35 likely pre-renal   - Continue IVF   - CTM   #GERD  - Continue PPI  #Elevated blood pressure  - If blood pressure remains elevated will start anti-hypertensive   Rest of plan as above .  Dispo: home with new insulin

## 2022-07-22 NOTE — DISCHARGE NOTE NURSING/CASE MANAGEMENT/SOCIAL WORK - [facility]_GEN_ALL_CORE_FT
An appointment to establish care with a primary care provider has been scheduled for you at the above clinic for 7/27/22 at 1:30 PM. Please call [phone] if you are unable to keep this appointment.    Please schedule to make an appointment with the endocrinology clinic at the same location and the same phone number as above.

## 2022-07-22 NOTE — PROGRESS NOTE ADULT - SUBJECTIVE AND OBJECTIVE BOX
PROGRESS NOTE:     Patient is a 27y old  Male who presents with a chief complaint of nausea/vomiting (21 Jul 2022 18:13)      SUBJECTIVE / OVERNIGHT EVENTS:  NAEON    ADDITIONAL REVIEW OF SYSTEMS:    MEDICATIONS  (STANDING):  atorvastatin 40 milliGRAM(s) Oral at bedtime  dextrose 5%. 1000 milliLiter(s) (100 mL/Hr) IV Continuous <Continuous>  dextrose 5%. 1000 milliLiter(s) (50 mL/Hr) IV Continuous <Continuous>  dextrose 50% Injectable 25 Gram(s) IV Push once  dextrose 50% Injectable 12.5 Gram(s) IV Push once  dextrose 50% Injectable 25 Gram(s) IV Push once  glucagon  Injectable 1 milliGRAM(s) IntraMuscular once  insulin glargine Injectable (LANTUS) 35 Unit(s) SubCutaneous <User Schedule>  insulin lispro (ADMELOG) corrective regimen sliding scale   SubCutaneous three times a day before meals  insulin lispro (ADMELOG) corrective regimen sliding scale   SubCutaneous at bedtime  insulin lispro Injectable (ADMELOG) 12 Unit(s) SubCutaneous three times a day before meals  lisinopril 10 milliGRAM(s) Oral daily  multivitamin 1 Tablet(s) Oral daily  pantoprazole    Tablet 40 milliGRAM(s) Oral before breakfast    MEDICATIONS  (PRN):  dextrose Oral Gel 15 Gram(s) Oral once PRN Blood Glucose LESS THAN 70 milliGRAM(s)/deciliter      CAPILLARY BLOOD GLUCOSE      POCT Blood Glucose.: 244 mg/dL (21 Jul 2022 22:31)  POCT Blood Glucose.: 259 mg/dL (21 Jul 2022 21:21)  POCT Blood Glucose.: 234 mg/dL (21 Jul 2022 18:08)  POCT Blood Glucose.: 358 mg/dL (21 Jul 2022 14:23)  POCT Blood Glucose.: 287 mg/dL (21 Jul 2022 11:58)  POCT Blood Glucose.: 232 mg/dL (21 Jul 2022 08:37)    I&O's Summary      PHYSICAL EXAM:  Vital Signs Last 24 Hrs  T(C): 36.7 (22 Jul 2022 05:05), Max: 36.7 (22 Jul 2022 05:05)  T(F): 98 (22 Jul 2022 05:05), Max: 98 (22 Jul 2022 05:05)  HR: 79 (22 Jul 2022 05:05) (79 - 84)  BP: 143/70 (22 Jul 2022 05:05) (140/71 - 143/70)  BP(mean): --  RR: 18 (22 Jul 2022 05:05) (18 - 18)  SpO2: 100% (22 Jul 2022 05:05) (98% - 100%)    Parameters below as of 22 Jul 2022 05:05  Patient On (Oxygen Delivery Method): room air        GENERAL: No acute distress, well-developed  HEAD:  Atraumatic, Normocephalic  EYES: EOMI, PERRLA, conjunctiva and sclera clear  NECK: Supple, no lymphadenopathy, no JVD  CHEST/LUNG: CTAB; No wheezes, rales, or rhonchi  HEART: Regular rate and rhythm; No murmurs, rubs, or gallops  ABDOMEN: Soft, non-tender, non-distended; normal bowel sounds, no organomegaly  EXTREMITIES:  2+ peripheral pulses b/l, No clubbing, cyanosis, or edema  NEUROLOGY: A&O x 3, no focal deficits  SKIN: No rashes or lesions    LABS:    07-21    136  |  98  |  10  ----------------------------<  274<H>  4.5   |  25  |  0.99    Ca    9.3      21 Jul 2022 06:24    TPro  x   /  Alb  x   /  TBili  0.8  /  DBili  x   /  AST  x   /  ALT  x   /  AlkPhos  x   07-21                RADIOLOGY & ADDITIONAL TESTS:  Results Reviewed:   Imaging Personally Reviewed:  Electrocardiogram Personally Reviewed:    COORDINATION OF CARE:  Care Discussed with Consultants/Other Providers [Y/N]:  Prior or Outpatient Records Reviewed [Y/N]:

## 2022-07-22 NOTE — PROGRESS NOTE ADULT - PROBLEM SELECTOR PROBLEM 2
DKA (diabetic ketoacidosis)
DKA (diabetic ketoacidosis)
DM (diabetes mellitus)
DKA (diabetic ketoacidosis)
DM (diabetes mellitus)
DM (diabetes mellitus)

## 2022-07-23 LAB
GAD65 AB SER-MCNC: 0.01 NMOL/L — SIGNIFICANT CHANGE UP
ISLET CELL512 AB SER-ACNC: SIGNIFICANT CHANGE UP

## 2022-07-26 LAB — ZINC TRANSPORTER 8 AB, RESULT: <15 U/ML — SIGNIFICANT CHANGE UP

## 2022-07-27 ENCOUNTER — OUTPATIENT (OUTPATIENT)
Dept: OUTPATIENT SERVICES | Facility: HOSPITAL | Age: 27
LOS: 1 days | End: 2022-07-27

## 2022-07-27 ENCOUNTER — MED ADMIN CHARGE (OUTPATIENT)
Age: 27
End: 2022-07-27

## 2022-07-27 ENCOUNTER — NON-APPOINTMENT (OUTPATIENT)
Age: 27
End: 2022-07-27

## 2022-07-27 ENCOUNTER — APPOINTMENT (OUTPATIENT)
Dept: INTERNAL MEDICINE | Facility: CLINIC | Age: 27
End: 2022-07-27

## 2022-07-27 VITALS
RESPIRATION RATE: 16 BRPM | SYSTOLIC BLOOD PRESSURE: 140 MMHG | OXYGEN SATURATION: 99 % | TEMPERATURE: 97.1 F | BODY MASS INDEX: 34.8 KG/M2 | WEIGHT: 271.13 LBS | HEIGHT: 74 IN | DIASTOLIC BLOOD PRESSURE: 88 MMHG | HEART RATE: 91 BPM

## 2022-07-27 DIAGNOSIS — Z83.438 FAMILY HISTORY OF OTHER DISORDER OF LIPOPROTEIN METABOLISM AND OTHER LIPIDEMIA: ICD-10-CM

## 2022-07-27 DIAGNOSIS — Z78.9 OTHER SPECIFIED HEALTH STATUS: ICD-10-CM

## 2022-07-27 DIAGNOSIS — E78.5 HYPERLIPIDEMIA, UNSPECIFIED: ICD-10-CM

## 2022-07-27 DIAGNOSIS — Z56.0 UNEMPLOYMENT, UNSPECIFIED: ICD-10-CM

## 2022-07-27 DIAGNOSIS — Z80.7 FAMILY HISTORY OF OTHER MALIGNANT NEOPLASMS OF LYMPHOID, HEMATOPOIETIC AND RELATED TISSUES: ICD-10-CM

## 2022-07-27 DIAGNOSIS — K76.0 FATTY (CHANGE OF) LIVER, NOT ELSEWHERE CLASSIFIED: ICD-10-CM

## 2022-07-27 DIAGNOSIS — Z82.49 FAMILY HISTORY OF ISCHEMIC HEART DISEASE AND OTHER DISEASES OF THE CIRCULATORY SYSTEM: ICD-10-CM

## 2022-07-27 PROCEDURE — 99203 OFFICE O/P NEW LOW 30 MIN: CPT | Mod: GC

## 2022-07-27 SDOH — ECONOMIC STABILITY - INCOME SECURITY: UNEMPLOYMENT, UNSPECIFIED: Z56.0

## 2022-07-27 NOTE — COUNSELING
[Use proper foot wear] : Use proper foot wear [Benefits of weight loss discussed] : Benefits of weight loss discussed [Encouraged to maintain food diary] : Encouraged to maintain food diary [Encouraged to increase physical activity] : Encouraged to increase physical activity

## 2022-07-28 PROBLEM — K76.0 FATTY LIVER DISEASE, NONALCOHOLIC: Status: ACTIVE | Noted: 2022-07-28

## 2022-07-28 NOTE — REVIEW OF SYSTEMS
[Frequency] : frequency [Fever] : no fever [Chills] : no chills [Fatigue] : no fatigue [Night Sweats] : no night sweats [Pain] : no pain [Dryness] : no dryness [Vision Problems] : no vision problems [Hearing Loss] : no hearing loss [Sore Throat] : no sore throat [Chest Pain] : no chest pain [Shortness Of Breath] : no shortness of breath [Palpitations] : no palpitations [Cough] : no cough [Dyspnea on Exertion] : not dyspnea on exertion [Abdominal Pain] : no abdominal pain [Nausea] : no nausea [Constipation] : no constipation [Diarrhea] : no diarrhea [Vomiting] : no vomiting [Dysuria] : no dysuria [Incontinence] : no incontinence [Joint Pain] : no joint pain [Joint Stiffness] : no joint stiffness [Itching] : no itching [Skin Rash] : no skin rash

## 2022-07-28 NOTE — HISTORY OF PRESENT ILLNESS
[FreeTextEntry1] : follow-up recent admission for HHS vs. DKA [de-identified] : Mr. Magy Hong is a 28 Y/o M w/ self-reported Sickle Cell Trait, recently diagnosed w/  HTN, HLD, w/ recent hospitalization for bg 720, A1c 12.8 HHS vs. DKA (07/19-07/22) coming in for a follow up of his recent hospitalization, started on Lantus 36u bedtime and Lispro Kwikpen 12u TID, awaiting workup for T2DM vs. autoimmune Diabetes. \par \par Today, he feels well. Has been taking his Insulin as directed, tries to check blood sugars regularly, but hasn't been checking regulary the past few days. Says his morning blood glucose was 120s and pre-meal glucose was 150 today. He denies having any episodes of abdominal pain, blurry vision, nausea/vomiting since discharge. Is endorsing increased thirst and polyuria, but says he drinks a lot of water at baseline anyways. Says he has changed his diet since discharge, has not drank soda/juice, no alcohol. Switched to whole grain pasta and brown rice.

## 2022-07-28 NOTE — PHYSICAL EXAM
[No Acute Distress] : no acute distress [Well Nourished] : well nourished [Well Developed] : well developed [Normal Sclera/Conjunctiva] : normal sclera/conjunctiva [PERRL] : pupils equal round and reactive to light [EOMI] : extraocular movements intact [Normal Oropharynx] : the oropharynx was normal [No JVD] : no jugular venous distention [Supple] : supple [No Respiratory Distress] : no respiratory distress  [Clear to Auscultation] : lungs were clear to auscultation bilaterally [Normal Rate] : normal rate  [Regular Rhythm] : with a regular rhythm [Normal S1, S2] : normal S1 and S2 [No Edema] : there was no peripheral edema [Soft] : abdomen soft [Non Tender] : non-tender [Non-distended] : non-distended [No Joint Swelling] : no joint swelling [No Rash] : no rash [Speech Grossly Normal] : speech grossly normal [Memory Grossly Normal] : memory grossly normal [Normal Affect] : the affect was normal [Coordination Grossly Intact] : coordination grossly intact [No Focal Deficits] : no focal deficits [Normal Gait] : normal gait [de-identified] : s

## 2022-07-28 NOTE — PLAN
[FreeTextEntry1] : Mr. Magy Hong is a 28 Y/O M w/ self-reported Sickle Cell Trait, recently diagnosed w/  HTN, HLD, w/ recent hospitalization for bg 720, A1c 12.8 HHS vs. DKA (07/19-07/22) coming in for a follow up of his recent hospitalization, started on Lantus 36u bedtime and Lispro Kwikpen 12u TID, awaiting workup for T2DM vs. autoimmune Diabetes. \par \par # Diabetes Mellitus, Type II vs. Autoimmune\par Today, Mr. Hong feels well, not experiencing hyperglycemic symptoms, is endorsing polydipsia/polyuria however. He is reporting a change in his diet, and his self-reported blood glucose this AM was 120s. He is not currently checking glucose regularly, is advised to check it in the morning  and prior to meals. In the clinic, his BG was 134 today. Considering his HTN, HLD and BMI 35, along with direct family history of diabetes, Mr. Hong likely has Type II Diabetes, less likely autoimmune diabetes. But labs for antibodies are not back yet, so diagnosis is not certain. Insulin regimen is working for patient currently and he understands how to administer insulin, blood glucose is controlled. Most recent HbA1c 12.8 upon hospitalization on 07/19.\par - F/u MICHAEL, islet cell, and Zn-transporter 8 antibodies labs\par - Continue current insulin regimen:  Lantus 36u bedtime, Lispro Kwikpen 12u TID prior to meals\par - F/u CBC, CMP, Albumin/Cr ratio labs ordered\par - HbA1c in 3 months.\par \par # Transaminitis\par # ? Fatty Liver Disease\par CMP results (07/27) show an AST 78/ . Alk Phos is 77, Tbili 0.5. Patient has no previous history of liver pathology. Differential include fatty liver disease vs. infectious liver disease (HBV/HCV) vs. shock liver (unlikely). Considering BMI 35, HTN, HLD and recent admission for HHS vs. DKA, most likely fatty liver disease. Will need to rule out HBV, HCV, Hemochromatosis. Next visit, will order an US of Liver for further assessment. Patient mentioned that he was told he had fatty liver disease during US test when he was hospitalized 07/19.\par - HBV Panel, HCV Panel\par - TIBC test for Hemochromatosis\par - f/u imaging results from hospitalization\par \par # Hypertension\par Blood pressure is 140/88 at visit. Has a previous history of HTN diagnosis at age 17, says he took medication for 1 year and then stopped as it was controlled.\par - Amlodipine 5mg daily\par \par # Hyperlipidemia\par Patient's lipid labs Cholesterol: 224, , HDL 24,  on latest labs 07/19. Will wait for LFTs labs results and next visit to start Lipitor.\par - Hold off Lipitor for now, will start next visit.\par \par # Health Maintenance\par - Administered PPSV23 vaccine for PNA prevention.\par \par \par Case discussed with Dr. Chase

## 2022-07-29 DIAGNOSIS — R94.5 ABNORMAL RESULTS OF LIVER FUNCTION STUDIES: ICD-10-CM

## 2022-07-29 DIAGNOSIS — I10 ESSENTIAL (PRIMARY) HYPERTENSION: ICD-10-CM

## 2022-07-29 DIAGNOSIS — E11.9 TYPE 2 DIABETES MELLITUS WITHOUT COMPLICATIONS: ICD-10-CM

## 2022-07-29 DIAGNOSIS — E11.65 TYPE 2 DIABETES MELLITUS WITH HYPERGLYCEMIA: ICD-10-CM

## 2022-07-29 DIAGNOSIS — E66.9 OBESITY, UNSPECIFIED: ICD-10-CM

## 2022-07-29 DIAGNOSIS — E78.00 PURE HYPERCHOLESTEROLEMIA, UNSPECIFIED: ICD-10-CM

## 2022-07-29 DIAGNOSIS — E78.5 HYPERLIPIDEMIA, UNSPECIFIED: ICD-10-CM

## 2022-07-29 LAB — GLUCOSE BLDC GLUCOMTR-MCNC: 134

## 2022-09-12 ENCOUNTER — RX RENEWAL (OUTPATIENT)
Age: 27
End: 2022-09-12

## 2022-09-13 ENCOUNTER — APPOINTMENT (OUTPATIENT)
Dept: INTERNAL MEDICINE | Facility: CLINIC | Age: 27
End: 2022-09-13

## 2022-09-14 ENCOUNTER — APPOINTMENT (OUTPATIENT)
Dept: INTERNAL MEDICINE | Facility: CLINIC | Age: 27
End: 2022-09-14

## 2022-09-14 ENCOUNTER — OUTPATIENT (OUTPATIENT)
Dept: OUTPATIENT SERVICES | Facility: HOSPITAL | Age: 27
LOS: 1 days | End: 2022-09-14

## 2022-09-14 VITALS
HEART RATE: 104 BPM | HEIGHT: 74 IN | TEMPERATURE: 97.3 F | WEIGHT: 273 LBS | DIASTOLIC BLOOD PRESSURE: 100 MMHG | SYSTOLIC BLOOD PRESSURE: 130 MMHG | BODY MASS INDEX: 35.04 KG/M2 | OXYGEN SATURATION: 98 %

## 2022-09-14 DIAGNOSIS — E11.9 TYPE 2 DIABETES MELLITUS W/OUT COMPLICATIONS: ICD-10-CM

## 2022-09-14 DIAGNOSIS — I10 ESSENTIAL (PRIMARY) HYPERTENSION: ICD-10-CM

## 2022-09-14 DIAGNOSIS — R74.01 ELEVATION OF LEVELS OF LIVER TRANSAMINASE LEVELS: ICD-10-CM

## 2022-09-14 DIAGNOSIS — Z23 ENCOUNTER FOR IMMUNIZATION: ICD-10-CM

## 2022-09-14 PROCEDURE — 99214 OFFICE O/P EST MOD 30 MIN: CPT | Mod: GC

## 2022-09-15 PROBLEM — Z23 ENCOUNTER FOR IMMUNIZATION: Status: ACTIVE | Noted: 2022-07-27

## 2022-09-15 PROBLEM — E11.9 DIABETES: Status: ACTIVE | Noted: 2022-07-28

## 2022-09-15 PROBLEM — I10 ESSENTIAL HYPERTENSION, BENIGN: Status: ACTIVE | Noted: 2022-07-27

## 2022-09-15 RX ORDER — BLOOD SUGAR DIAGNOSTIC
STRIP MISCELLANEOUS 4 TIMES DAILY
Qty: 100 | Refills: 0 | Status: ACTIVE | COMMUNITY
Start: 2022-07-22

## 2022-09-15 RX ORDER — CHLORTHALIDONE 25 MG/1
25 TABLET ORAL DAILY
Qty: 15 | Refills: 3 | Status: ACTIVE | COMMUNITY
Start: 2022-09-14

## 2022-09-15 RX ORDER — AMLODIPINE BESYLATE 5 MG/1
5 TABLET ORAL DAILY
Qty: 60 | Refills: 0 | Status: ACTIVE | COMMUNITY
Start: 2022-07-28

## 2022-09-15 RX ORDER — LANCETS 33 GAUGE
EACH MISCELLANEOUS
Qty: 100 | Refills: 0 | Status: ACTIVE | COMMUNITY
Start: 2022-07-22

## 2022-09-15 RX ORDER — ELECTROLYTES/DEXTROSE
32G X 4 MM SOLUTION, ORAL ORAL
Qty: 1 | Refills: 3 | Status: ACTIVE | COMMUNITY
Start: 2022-09-12

## 2022-09-15 RX ORDER — PEN NEEDLE, DIABETIC 29 G X1/2"
32G X 4 MM NEEDLE, DISPOSABLE MISCELLANEOUS
Qty: 100 | Refills: 1 | Status: ACTIVE | COMMUNITY
Start: 2022-07-22

## 2022-09-15 NOTE — PHYSICAL EXAM
[No Acute Distress] : no acute distress [Well Nourished] : well nourished [Well Developed] : well developed [Well-Appearing] : well-appearing [Normal Sclera/Conjunctiva] : normal sclera/conjunctiva [EOMI] : extraocular movements intact [Normal Outer Ear/Nose] : the outer ears and nose were normal in appearance [Supple] : supple [No Respiratory Distress] : no respiratory distress  [No Accessory Muscle Use] : no accessory muscle use [Clear to Auscultation] : lungs were clear to auscultation bilaterally [Normal Rate] : normal rate  [Regular Rhythm] : with a regular rhythm [Normal S1, S2] : normal S1 and S2 [No Focal Deficits] : no focal deficits

## 2022-09-16 ENCOUNTER — NON-APPOINTMENT (OUTPATIENT)
Age: 27
End: 2022-09-16

## 2022-09-16 NOTE — PLAN
[FreeTextEntry1] : Mr. Magy Hong is a 28 Y/O M w/ self-reported Sickle Cell Trait, recently diagnosed w/ HTN, HLD, w/ recent hospitalization for bg 720, A1c 12.8 HHS vs. DKA (07/19-07/22) started on Lantus 36u bedtime and Lispro Kwikpen 12u TID, workup negative for autoimmune Diabetes presenting for job physical\par \par # Diabetes Mellitus, Type II vs. Autoimmune\par Today, Mr. Hong feels well, not experiencing hyperglycemic symptoms, is endorsing polydipsia/polyuria however. He is reporting a change in his diet, and his self-reported blood glucose this AM was 120s. He is not currently checking glucose regularly, is advised to check it in the morning and prior to meals. In the clinic, his BG was 134 today. Considering his HTN, HLD and BMI 35, along with direct family history of diabetes, Mr. Hong likely has Type II Diabetes, less likely autoimmune diabetes. But labs for antibodies are not back yet, so diagnosis is not certain. Insulin regimen is working for patient currently and he understands how to administer insulin, blood glucose is controlled. Most recent HbA1c 12.8 upon hospitalization on 07/19.\par - MICHAEL, islet cell, and Zn-transporter 8 antibodies labs negative \par - Continue current insulin regimen: Lantus 36u bedtime, Lispro Kwikpen 12u TID prior to meals\par - F/u CBC, CMP, Albumin/Cr ratio labs ordered\par - HbA1c in 3 months\par \par # Transaminitis\par # ? Fatty Liver Disease\par CMP results (07/27) show an AST 78/ . Alk Phos is 77, Tbili 0.5. Patient has no previous history of liver pathology. Differential include fatty liver disease vs. infectious liver disease (HBV/HCV) vs. shock liver (unlikely). Considering BMI 35, HTN, HLD and recent admission for HHS vs. DKA, most likely fatty liver disease. Will need to rule out HBV, HCV, Hemochromatosis. Next visit, will order an US of Liver for further assessment. Patient mentioned that he was told he had fatty liver disease during US test when he was hospitalized 07/19.\par - HBV Panel, HCV Panel\par - TIBC test for Hemochromatosis\par - f/u imaging results from hospitalization\par \par # Hypertension\par Blood pressure is 130/100 at visit. Has a previous history of HTN diagnosis at age 17, says he took medication for 1 year and then stopped as it was controlled.\par - Amlodipine 5mg daily\par -will add chlorthalidone 12.5 given better bp control \par \par # Hyperlipidemia\par Patient's lipid labs Cholesterol: 224, , HDL 24,  on latest labs 07/19. Will wait for LFTs labs results and next visit to start Lipitor \par - Hold off Lipitor for now, will start next visit.\par \par # Health Maintenance\par - Administered PPSV23 vaccine for PNA prevention \par \par RTC in 5 weeks for BP reassessment\par  \par Case discussed with Dr. Chase\par \par Chet Forrest \par EM/IM PGY4

## 2022-09-16 NOTE — HISTORY OF PRESENT ILLNESS
[FreeTextEntry8] : 26 Y/o M w/ self-reported Sickle Cell Trait, recently diagnosed w/ HTN, HLD, w/ recent hospitalization for bg 720, A1c 12.8 HHS vs. DKA (07/19-07/22) started on Lantus 36u bedtime and Lispro Kwikpen 12u TID, awaiting workup negative for autoimmune Diabetes presents for work forms. \par \par Patient has no complaints, no issues w/ insulin administration or obtaining medications. Patient endorses med compliance. Patient comes into office for routine physical, quant, hep B and MMR titers. \par 
DISPLAY PLAN FREE TEXT

## 2022-09-19 ENCOUNTER — NON-APPOINTMENT (OUTPATIENT)
Age: 27
End: 2022-09-19

## 2022-09-19 LAB
ALBUMIN SERPL ELPH-MCNC: 4.3 G/DL
ALP BLD-CCNC: 77 U/L
ALT SERPL-CCNC: 114 U/L
ANION GAP SERPL CALC-SCNC: 11 MMOL/L
AST SERPL-CCNC: 78 U/L
BASOPHILS # BLD AUTO: 0.02 K/UL
BASOPHILS NFR BLD AUTO: 0.5 %
BILIRUB SERPL-MCNC: 0.5 MG/DL
BUN SERPL-MCNC: 8 MG/DL
CALCIUM SERPL-MCNC: 9.5 MG/DL
CHLORIDE SERPL-SCNC: 106 MMOL/L
CHOLEST SERPL-MCNC: 151 MG/DL
CO2 SERPL-SCNC: 25 MMOL/L
CREAT SERPL-MCNC: 0.94 MG/DL
CREAT SPEC-SCNC: 123 MG/DL
EGFR: 114 ML/MIN/1.73M2
EOSINOPHIL # BLD AUTO: 0.18 K/UL
EOSINOPHIL NFR BLD AUTO: 4.4 %
GLUCOSE SERPL-MCNC: 130 MG/DL
HBV SURFACE AB SER QL: NONREACTIVE
HBV SURFACE AB SERPL IA-ACNC: <3 MIU/ML
HBV SURFACE AG SER QL: NONREACTIVE
HCT VFR BLD CALC: 43.4 %
HDLC SERPL-MCNC: 33 MG/DL
HGB BLD-MCNC: 14.3 G/DL
IMM GRANULOCYTES NFR BLD AUTO: 0.2 %
LDLC SERPL CALC-MCNC: 104 MG/DL
LYMPHOCYTES # BLD AUTO: 1.54 K/UL
LYMPHOCYTES NFR BLD AUTO: 37.7 %
M TB IFN-G BLD-IMP: NEGATIVE
MAN DIFF?: NORMAL
MCHC RBC-ENTMCNC: 28.3 PG
MCHC RBC-ENTMCNC: 32.9 GM/DL
MCV RBC AUTO: 85.8 FL
MEV IGG FLD QL IA: 8.3 AU/ML
MEV IGG+IGM SER-IMP: NEGATIVE
MICROALBUMIN 24H UR DL<=1MG/L-MCNC: <1.2 MG/DL
MICROALBUMIN/CREAT 24H UR-RTO: NORMAL MG/G
MONOCYTES # BLD AUTO: 0.33 K/UL
MONOCYTES NFR BLD AUTO: 8.1 %
MUV AB SER-ACNC: POSITIVE
MUV IGG SER QL IA: 14 AU/ML
NEUTROPHILS # BLD AUTO: 2 K/UL
NEUTROPHILS NFR BLD AUTO: 49.1 %
NONHDLC SERPL-MCNC: 118 MG/DL
PLATELET # BLD AUTO: 233 K/UL
POTASSIUM SERPL-SCNC: 4.3 MMOL/L
PROT SERPL-MCNC: 6.8 G/DL
QUANTIFERON TB PLUS MITOGEN MINUS NIL: >10 IU/ML
QUANTIFERON TB PLUS NIL: 0.01 IU/ML
QUANTIFERON TB PLUS TB1 MINUS NIL: 0 IU/ML
QUANTIFERON TB PLUS TB2 MINUS NIL: 0.01 IU/ML
RBC # BLD: 5.06 M/UL
RBC # FLD: 13.6 %
RUBV IGG FLD-ACNC: 1.3 INDEX
RUBV IGG SER-IMP: POSITIVE
SODIUM SERPL-SCNC: 143 MMOL/L
TRIGL SERPL-MCNC: 70 MG/DL
VZV AB TITR SER: POSITIVE
VZV IGG SER IF-ACNC: 1334 INDEX
WBC # FLD AUTO: 4.08 K/UL

## 2022-09-21 ENCOUNTER — APPOINTMENT (OUTPATIENT)
Dept: INTERNAL MEDICINE | Facility: CLINIC | Age: 27
End: 2022-09-21

## 2022-09-22 DIAGNOSIS — E11.9 TYPE 2 DIABETES MELLITUS WITHOUT COMPLICATIONS: ICD-10-CM

## 2022-09-22 DIAGNOSIS — E11.65 TYPE 2 DIABETES MELLITUS WITH HYPERGLYCEMIA: ICD-10-CM

## 2022-09-22 DIAGNOSIS — I10 ESSENTIAL (PRIMARY) HYPERTENSION: ICD-10-CM

## 2022-09-23 ENCOUNTER — APPOINTMENT (OUTPATIENT)
Dept: INTERNAL MEDICINE | Facility: CLINIC | Age: 27
End: 2022-09-23

## 2022-09-23 ENCOUNTER — MED ADMIN CHARGE (OUTPATIENT)
Age: 27
End: 2022-09-23

## 2022-09-23 ENCOUNTER — OUTPATIENT (OUTPATIENT)
Dept: OUTPATIENT SERVICES | Facility: HOSPITAL | Age: 27
LOS: 1 days | End: 2022-09-23

## 2022-09-23 PROBLEM — R74.01 TRANSAMINITIS: Status: ACTIVE | Noted: 2022-09-23

## 2022-09-26 DIAGNOSIS — Z23 ENCOUNTER FOR IMMUNIZATION: ICD-10-CM

## 2022-09-26 DIAGNOSIS — E11.9 TYPE 2 DIABETES MELLITUS WITHOUT COMPLICATIONS: ICD-10-CM

## 2022-09-26 LAB
ALBUMIN SERPL ELPH-MCNC: 4.7 G/DL
ALP BLD-CCNC: 62 U/L
ALT SERPL-CCNC: 39 U/L
ANION GAP SERPL CALC-SCNC: 13 MMOL/L
AST SERPL-CCNC: 32 U/L
BILIRUB SERPL-MCNC: 0.6 MG/DL
BUN SERPL-MCNC: 15 MG/DL
CALCIUM SERPL-MCNC: 9.1 MG/DL
CHLORIDE SERPL-SCNC: 105 MMOL/L
CO2 SERPL-SCNC: 23 MMOL/L
CREAT SERPL-MCNC: 1.16 MG/DL
EGFR: 89 ML/MIN/1.73M2
GLUCOSE SERPL-MCNC: 90 MG/DL
HCV AB SER QL: NONREACTIVE
HCV S/CO RATIO: 0.09 S/CO
IRON SATN MFR SERPL: 17 %
IRON SERPL-MCNC: 50 UG/DL
POTASSIUM SERPL-SCNC: 4.5 MMOL/L
PROT SERPL-MCNC: 6.9 G/DL
SODIUM SERPL-SCNC: 141 MMOL/L
TIBC SERPL-MCNC: 298 UG/DL
UIBC SERPL-MCNC: 248 UG/DL

## 2022-09-27 RX ORDER — FLASH GLUCOSE SCANNING READER
EACH MISCELLANEOUS
Qty: 1 | Refills: 11 | Status: ACTIVE | COMMUNITY
Start: 2022-07-28 | End: 1900-01-01

## 2022-09-27 RX ORDER — FLASH GLUCOSE SENSOR
KIT MISCELLANEOUS
Qty: 1 | Refills: 11 | Status: ACTIVE | COMMUNITY
Start: 2022-07-28 | End: 1900-01-01

## 2022-10-04 ENCOUNTER — APPOINTMENT (OUTPATIENT)
Dept: INTERNAL MEDICINE | Facility: CLINIC | Age: 27
End: 2022-10-04

## 2022-10-04 ENCOUNTER — MED ADMIN CHARGE (OUTPATIENT)
Age: 27
End: 2022-10-04

## 2022-10-04 ENCOUNTER — OUTPATIENT (OUTPATIENT)
Dept: OUTPATIENT SERVICES | Facility: HOSPITAL | Age: 27
LOS: 1 days | End: 2022-10-04

## 2022-10-05 DIAGNOSIS — Z23 ENCOUNTER FOR IMMUNIZATION: ICD-10-CM

## 2022-10-05 LAB
ALBUMIN SERPL ELPH-MCNC: 4.5 G/DL
ALP BLD-CCNC: 67 U/L
ALT SERPL-CCNC: 37 U/L
ANION GAP SERPL CALC-SCNC: 10 MMOL/L
AST SERPL-CCNC: 27 U/L
BASOPHILS # BLD AUTO: 0.02 K/UL
BASOPHILS NFR BLD AUTO: 0.5 %
BILIRUB SERPL-MCNC: 0.4 MG/DL
BUN SERPL-MCNC: 11 MG/DL
CALCIUM SERPL-MCNC: 9.7 MG/DL
CHLORIDE SERPL-SCNC: 105 MMOL/L
CHOLEST SERPL-MCNC: 153 MG/DL
CO2 SERPL-SCNC: 26 MMOL/L
CREAT SERPL-MCNC: 1.24 MG/DL
EGFR: 82 ML/MIN/1.73M2
EOSINOPHIL # BLD AUTO: 0.1 K/UL
EOSINOPHIL NFR BLD AUTO: 2.7 %
ESTIMATED AVERAGE GLUCOSE: 143 MG/DL
GLUCOSE SERPL-MCNC: 94 MG/DL
HBA1C MFR BLD HPLC: 6.6 %
HCT VFR BLD CALC: 43 %
HDLC SERPL-MCNC: 33 MG/DL
HGB BLD-MCNC: 14.7 G/DL
IMM GRANULOCYTES NFR BLD AUTO: 0 %
LDLC SERPL CALC-MCNC: 99 MG/DL
LYMPHOCYTES # BLD AUTO: 1.92 K/UL
LYMPHOCYTES NFR BLD AUTO: 51.3 %
MAN DIFF?: NORMAL
MCHC RBC-ENTMCNC: 28.3 PG
MCHC RBC-ENTMCNC: 34.2 GM/DL
MCV RBC AUTO: 82.7 FL
MONOCYTES # BLD AUTO: 0.25 K/UL
MONOCYTES NFR BLD AUTO: 6.7 %
NEUTROPHILS # BLD AUTO: 1.45 K/UL
NEUTROPHILS NFR BLD AUTO: 38.8 %
NONHDLC SERPL-MCNC: 120 MG/DL
PLATELET # BLD AUTO: 236 K/UL
POTASSIUM SERPL-SCNC: 4.4 MMOL/L
PROT SERPL-MCNC: 7 G/DL
RBC # BLD: 5.2 M/UL
RBC # FLD: 13.2 %
SODIUM SERPL-SCNC: 141 MMOL/L
TRIGL SERPL-MCNC: 105 MG/DL
WBC # FLD AUTO: 3.74 K/UL

## 2022-10-18 ENCOUNTER — NON-APPOINTMENT (OUTPATIENT)
Age: 27
End: 2022-10-18

## 2022-10-31 RX ORDER — INSULIN LISPRO 100 [IU]/ML
100 INJECTION, SOLUTION INTRAVENOUS; SUBCUTANEOUS 3 TIMES DAILY
Qty: 1 | Refills: 1 | Status: DISCONTINUED | COMMUNITY
Start: 2022-07-22 | End: 2022-10-31

## 2022-11-03 RX ORDER — INSULIN LISPRO 100 U/ML
100 INJECTION, SOLUTION SUBCUTANEOUS
Qty: 1 | Refills: 0 | Status: ACTIVE | COMMUNITY
Start: 2022-10-31 | End: 1900-01-01

## 2022-11-29 ENCOUNTER — APPOINTMENT (OUTPATIENT)
Dept: ENDOCRINOLOGY | Facility: CLINIC | Age: 27
End: 2022-11-29

## 2022-12-23 RX ORDER — INSULIN GLARGINE 100 [IU]/ML
100 INJECTION, SOLUTION SUBCUTANEOUS DAILY
Qty: 1 | Refills: 1 | Status: DISCONTINUED | COMMUNITY
Start: 2022-07-22 | End: 2022-12-23

## 2023-01-06 RX ORDER — INSULIN GLARGINE 100 [IU]/ML
100 INJECTION, SOLUTION SUBCUTANEOUS DAILY
Qty: 5 | Refills: 0 | Status: COMPLETED | COMMUNITY
Start: 2022-12-23 | End: 2023-01-06

## 2023-01-06 RX ORDER — INSULIN GLARGINE-YFGN 100 [IU]/ML
100 INJECTION, SOLUTION SUBCUTANEOUS
Qty: 1 | Refills: 2 | Status: ACTIVE | COMMUNITY
Start: 2023-01-06 | End: 1900-01-01

## 2023-04-18 ENCOUNTER — APPOINTMENT (OUTPATIENT)
Dept: ENDOCRINOLOGY | Facility: CLINIC | Age: 28
End: 2023-04-18

## 2023-05-03 ENCOUNTER — APPOINTMENT (OUTPATIENT)
Dept: INTERNAL MEDICINE | Facility: CLINIC | Age: 28
End: 2023-05-03

## 2023-07-18 NOTE — PATIENT PROFILE ADULT - OVER THE PAST TWO WEEKS HAVE YOU FELT DOWN, DEPRESSED OR HOPELESS?
"Behavioral Health Psychotherapy Progress Note    Psychotherapy Provided: Individual Psychotherapy     1  Anxiety            Goals addressed in session: Goal 1     DATA: The therapist ask Maura Glover how work is going  Maura Glover reports, \"It's fine, I'm back at [employer]  \" Maura Glover provides an update in regard to getting rehired at [employer] and reports that he is feeling better  The therapist ask Maura Glover about his anxiety  \"It's a bit, but I'm feeling accomplished and I passed my math exam and I'm done and graduating from [employer] this month  \" The therapist ask Maura Glover how he feels  Maura Glover reports, \"Accomlished, but eh \" Maura Glover provides more details in regard to his feelings of graduating later and being in his mid twenties and feeling behind and I have remind myself that I have to go at my own pace  \" Maura Glover reports, \"I almost gave up, I'm not going to lie to you  \" The therapist and Maura Glover discuss why he chose not to give up  \" Maura Glover and the therapist discuss his increase in anxiety symptoms and insomnia issues  The therapist works with Guillermina Williamson current stressors and identified possible solutions to these problems as well as the pros and cons for each solution  Maura Glover reports concerns about the loss of his health insurance and wanting to continue treatment  The therapist advises Maura Glover to reach out to the office to inquire about the cost of self pay  Maura Glover agrees to the plan of action  During this session, this clinician used the following therapeutic modalities: Cognitive Behavioral Therapy and Supportive Psychotherapy    Substance Abuse was not addressed during this session  If the client is diagnosed with a co-occurring substance use disorder, please indicate any changes in the frequency or amount of use: N/A  Stage of change for addressing substance use diagnoses: No substance use/Not applicable    ASSESSMENT:  Samson Diggs presents with a Euthymic/ normal mood       his affect is Normal range and intensity, which is congruent, with his mood and the "
[facility]    2023 4:11 PM    Assessment/Plan:    Patient presents today with therapeutic INR.    Assessment of patient findings and chart review: no significant findings     Recommendation for patient's warfarin regimen: Continue current maintenance dose    Recommend repeat INR in 1 week  _________________________________________________________________    Tim Richards (56 y.o.) is followed by the [facility].    Anticoagulation Summary  As of 2023      INR goal:  2.0-3.0   TTR:  69.9 % (4.8 y)   INR used for dosin.7 (2023)   Warfarin maintenance plan:  5 mg (5 mg x 1) every day   Weekly warfarin total:  35 mg   Plan last modified:  Pearl Mendez, PharmD (2023)   Next INR check:  2023   Target end date:      Indications    LVAD (left ventricular assist device) present (Resolved) [Z95.811]  Anticoagulation monitoring  INR range 2-3 (Resolved) [Z79.01]                 Anticoagulation Episode Summary       INR check location:  Clinic Lab    Preferred lab:      Send INR reminders to:  FAUSTINA COUMADIN LVAD    Comments:  LVAD// [facility]//Lab [facility] Results: [phone] Acct# [id number] Phone: [phone]//dc [facility] [phone], fax [phone] //pt declined meter, see 10/11/22 & 22 notes// Bridge:NO (h/o bleeds)          Anticoagulation Care Providers       Provider Role Specialty Phone number    Antonio Hadley Jr., MD Responsible Transplant [phone]                            
"content of the session  The client has not made progress on their goals  Victor M Ortega was engaged throughout the session and despite being at a new place of employment continues to have difficulties with management  Kori Olivier presents with a none risk of suicide, none risk of self-harm, and none risk of harm to others  For any risk assessment that surpasses a \"low\" rating, a safety plan must be developed  A safety plan was indicated: no  If yes, describe in detail N/A    PLAN: Between sessions, Kori Olivier will contact the office in regard to insurance and self pay  At the next session, the therapist will use Cognitive Behavioral Therapy and Supportive Psychotherapy to address reducing symptoms of anxiety through the use of CBT and increase use of existing coping skills   Behavioral Health Treatment Plan and Discharge Planning: Kori Olivier is aware of and agrees to continue to work on their treatment plan  They have identified and are working toward their discharge goals  yes    Visit start and stop times:    05/19/23  Start Time: 1300  Stop Time: 1345  Total Visit Time: 45 minutes    Virtual Regular Visit    Verification of patient location:    Patient is located at Home in the following state in which I hold an active license [place]      Assessment/Plan:    Problem List Items Addressed This Visit        Other    Anxiety - Primary       Goals addressed in session: Goal 1          Reason for visit is   Chief Complaint   Patient presents with   • Virtual Regular Visit        Encounter provider Simón Kidd LCSW    Provider located at [address] [place] [id number] [phone]      Recent Visits  No visits were found meeting these conditions    Showing recent visits within past 7 days and meeting all other requirements  Today's Visits  Date Type Provider Dept   05/19/23 Telemedicine Simón Kidd, "
LCSW [facility] Therapist [facility]   Showing today's visits and meeting all other requirements  Future Appointments  No visits were found meeting these conditions  Showing future appointments within next 150 days and meeting all other requirements       The patient was identified by name and date of birth  Linh Paredes was informed that this is a telemedicine visit and [facility]  He agrees to proceed     My office door was closed  No one else was in the room  He acknowledged consent and understanding of privacy and security of the video platform  The patient has agreed to participate and understands they can discontinue the visit at any time  Patient is aware this is a billable service  Subjective  Linh Paredes is a 22 y o  male    HPI     No past medical history on file  No past surgical history on file  Current Outpatient Medications   Medication Sig Dispense Refill   • fluticasone (FLONASE) 50 mcg/act nasal spray 1 spray into each nostril daily 18 2 mL 3   • loratadine (CLARITIN) 10 mg tablet Take 1 tablet (10 mg total) by mouth daily 30 tablet 0     No current facility-administered medications for this visit  No Known Allergies    Review of Systems    Video Exam    There were no vitals filed for this visit      Physical Exam
no

## 2023-08-15 ENCOUNTER — APPOINTMENT (OUTPATIENT)
Dept: ENDOCRINOLOGY | Facility: CLINIC | Age: 28
End: 2023-08-15
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
## 2023-09-12 NOTE — DISCHARGE NOTE NURSING/CASE MANAGEMENT/SOCIAL WORK - PATIENT PORTAL LINK FT
You can access the FollowMyHealth Patient Portal offered by [facility] by registering at the following website: [email or web address]. By joining [facility]’s FollowMyHealth portal, you will also be able to view your health information using other applications (apps) compatible with our system. Birth Control Pills Pregnancy And Lactation Text: This medication should be avoided if pregnant and for the first 30 days post-partum.

## 2024-10-22 ENCOUNTER — APPOINTMENT (OUTPATIENT)
Dept: OBGYN | Facility: CLINIC | Age: 29
End: 2024-10-22
Payer: COMMERCIAL

## 2024-10-22 DIAGNOSIS — Z13.71 ENCOUNTER FOR NONPROCREATIVE SCREENING FOR GENETIC DISEASE CARRIER STATUS: ICD-10-CM

## 2024-10-22 PROCEDURE — 36415 COLL VENOUS BLD VENIPUNCTURE: CPT

## 2024-10-24 PROBLEM — Z13.71 SCREENING FOR GENETIC DISEASE CARRIER STATUS: Status: ACTIVE | Noted: 2024-10-24

## 2024-11-06 ENCOUNTER — NON-APPOINTMENT (OUTPATIENT)
Age: 29
End: 2024-11-06

## 2025-01-25 ENCOUNTER — EMERGENCY (EMERGENCY)
Facility: HOSPITAL | Age: 30
LOS: 1 days | Discharge: HOME | End: 2025-01-25
Attending: STUDENT IN AN ORGANIZED HEALTH CARE EDUCATION/TRAINING PROGRAM | Admitting: EMERGENCY MEDICINE
Payer: COMMERCIAL

## 2025-01-25 VITALS
HEART RATE: 100 BPM | DIASTOLIC BLOOD PRESSURE: 86 MMHG | RESPIRATION RATE: 19 BRPM | WEIGHT: 250 LBS | OXYGEN SATURATION: 96 % | SYSTOLIC BLOOD PRESSURE: 146 MMHG | TEMPERATURE: 98 F

## 2025-01-25 PROCEDURE — 99284 EMERGENCY DEPT VISIT MOD MDM: CPT

## 2025-01-25 NOTE — ED ADULT TRIAGE NOTE - CHIEF COMPLAINT QUOTE
Pt c/o n/v and abd pain after taking theraflu. tested Flu+. Hx T2DM well appearing. Denies chest pain, sob,

## 2025-01-26 VITALS
OXYGEN SATURATION: 99 % | SYSTOLIC BLOOD PRESSURE: 136 MMHG | DIASTOLIC BLOOD PRESSURE: 79 MMHG | RESPIRATION RATE: 20 BRPM | TEMPERATURE: 98 F | HEART RATE: 73 BPM

## 2025-01-26 LAB
ALBUMIN SERPL ELPH-MCNC: 3.5 G/DL — SIGNIFICANT CHANGE UP (ref 3.3–5)
ALBUMIN SERPL ELPH-MCNC: 4.2 G/DL — SIGNIFICANT CHANGE UP (ref 3.3–5)
ALP SERPL-CCNC: 52 U/L — SIGNIFICANT CHANGE UP (ref 40–120)
ALP SERPL-CCNC: 62 U/L — SIGNIFICANT CHANGE UP (ref 40–120)
ALT FLD-CCNC: 80 U/L — HIGH (ref 4–41)
ALT FLD-CCNC: 91 U/L — HIGH (ref 4–41)
ANION GAP SERPL CALC-SCNC: 14 MMOL/L — SIGNIFICANT CHANGE UP (ref 7–14)
ANION GAP SERPL CALC-SCNC: 17 MMOL/L — HIGH (ref 7–14)
APPEARANCE UR: CLEAR — SIGNIFICANT CHANGE UP
AST SERPL-CCNC: 54 U/L — HIGH (ref 4–40)
AST SERPL-CCNC: 78 U/L — HIGH (ref 4–40)
B-OH-BUTYR SERPL-SCNC: 1.2 MMOL/L — HIGH (ref 0–0.4)
B-OH-BUTYR SERPL-SCNC: 2 MMOL/L — HIGH (ref 0–0.4)
BACTERIA # UR AUTO: NEGATIVE /HPF — SIGNIFICANT CHANGE UP
BASOPHILS # BLD AUTO: 0.01 K/UL — SIGNIFICANT CHANGE UP (ref 0–0.2)
BASOPHILS NFR BLD AUTO: 0.2 % — SIGNIFICANT CHANGE UP (ref 0–2)
BILIRUB SERPL-MCNC: 0.6 MG/DL — SIGNIFICANT CHANGE UP (ref 0.2–1.2)
BILIRUB SERPL-MCNC: 0.8 MG/DL — SIGNIFICANT CHANGE UP (ref 0.2–1.2)
BILIRUB UR-MCNC: NEGATIVE — SIGNIFICANT CHANGE UP
BLOOD GAS VENOUS COMPREHENSIVE RESULT: SIGNIFICANT CHANGE UP
BLOOD GAS VENOUS COMPREHENSIVE RESULT: SIGNIFICANT CHANGE UP
BUN SERPL-MCNC: 10 MG/DL — SIGNIFICANT CHANGE UP (ref 7–23)
BUN SERPL-MCNC: 13 MG/DL — SIGNIFICANT CHANGE UP (ref 7–23)
CALCIUM SERPL-MCNC: 7.9 MG/DL — LOW (ref 8.4–10.5)
CALCIUM SERPL-MCNC: 9.4 MG/DL — SIGNIFICANT CHANGE UP (ref 8.4–10.5)
CAST: 7 /LPF — HIGH (ref 0–4)
CHLORIDE SERPL-SCNC: 101 MMOL/L — SIGNIFICANT CHANGE UP (ref 98–107)
CHLORIDE SERPL-SCNC: 94 MMOL/L — LOW (ref 98–107)
CO2 SERPL-SCNC: 21 MMOL/L — LOW (ref 22–31)
CO2 SERPL-SCNC: 22 MMOL/L — SIGNIFICANT CHANGE UP (ref 22–31)
COLOR SPEC: YELLOW — SIGNIFICANT CHANGE UP
CREAT SERPL-MCNC: 1.08 MG/DL — SIGNIFICANT CHANGE UP (ref 0.5–1.3)
CREAT SERPL-MCNC: 1.22 MG/DL — SIGNIFICANT CHANGE UP (ref 0.5–1.3)
DIFF PNL FLD: NEGATIVE — SIGNIFICANT CHANGE UP
EGFR: 82 ML/MIN/1.73M2 — SIGNIFICANT CHANGE UP
EGFR: 95 ML/MIN/1.73M2 — SIGNIFICANT CHANGE UP
EOSINOPHIL # BLD AUTO: 0 K/UL — SIGNIFICANT CHANGE UP (ref 0–0.5)
EOSINOPHIL NFR BLD AUTO: 0 % — SIGNIFICANT CHANGE UP (ref 0–6)
FLUAV AG NPH QL: DETECTED
FLUBV AG NPH QL: SIGNIFICANT CHANGE UP
GLUCOSE SERPL-MCNC: 196 MG/DL — HIGH (ref 70–99)
GLUCOSE SERPL-MCNC: 239 MG/DL — HIGH (ref 70–99)
GLUCOSE UR QL: 500 MG/DL
HCT VFR BLD CALC: 49.4 % — SIGNIFICANT CHANGE UP (ref 39–50)
HGB BLD-MCNC: 17 G/DL — SIGNIFICANT CHANGE UP (ref 13–17)
IANC: 3.51 K/UL — SIGNIFICANT CHANGE UP (ref 1.8–7.4)
IMM GRANULOCYTES NFR BLD AUTO: 0.4 % — SIGNIFICANT CHANGE UP (ref 0–0.9)
KETONES UR-MCNC: 80 MG/DL
LEUKOCYTE ESTERASE UR-ACNC: NEGATIVE — SIGNIFICANT CHANGE UP
LIDOCAIN IGE QN: 18 U/L — SIGNIFICANT CHANGE UP (ref 7–60)
LYMPHOCYTES # BLD AUTO: 0.61 K/UL — LOW (ref 1–3.3)
LYMPHOCYTES # BLD AUTO: 13.2 % — SIGNIFICANT CHANGE UP (ref 13–44)
MAGNESIUM SERPL-MCNC: 2 MG/DL — SIGNIFICANT CHANGE UP (ref 1.6–2.6)
MCHC RBC-ENTMCNC: 28.1 PG — SIGNIFICANT CHANGE UP (ref 27–34)
MCHC RBC-ENTMCNC: 34.4 G/DL — SIGNIFICANT CHANGE UP (ref 32–36)
MCV RBC AUTO: 81.8 FL — SIGNIFICANT CHANGE UP (ref 80–100)
MONOCYTES # BLD AUTO: 0.48 K/UL — SIGNIFICANT CHANGE UP (ref 0–0.9)
MONOCYTES NFR BLD AUTO: 10.4 % — SIGNIFICANT CHANGE UP (ref 2–14)
NEUTROPHILS # BLD AUTO: 3.51 K/UL — SIGNIFICANT CHANGE UP (ref 1.8–7.4)
NEUTROPHILS NFR BLD AUTO: 75.8 % — SIGNIFICANT CHANGE UP (ref 43–77)
NITRITE UR-MCNC: NEGATIVE — SIGNIFICANT CHANGE UP
NRBC # BLD: 0 /100 WBCS — SIGNIFICANT CHANGE UP (ref 0–0)
NRBC # FLD: 0 K/UL — SIGNIFICANT CHANGE UP (ref 0–0)
OSMOLALITY SERPL: 293 MOSM/KG — SIGNIFICANT CHANGE UP (ref 275–295)
PH UR: 6 — SIGNIFICANT CHANGE UP (ref 5–8)
PHOSPHATE SERPL-MCNC: 3.5 MG/DL — SIGNIFICANT CHANGE UP (ref 2.5–4.5)
PLATELET # BLD AUTO: 200 K/UL — SIGNIFICANT CHANGE UP (ref 150–400)
POTASSIUM SERPL-MCNC: 3.8 MMOL/L — SIGNIFICANT CHANGE UP (ref 3.5–5.3)
POTASSIUM SERPL-MCNC: 4.4 MMOL/L — SIGNIFICANT CHANGE UP (ref 3.5–5.3)
POTASSIUM SERPL-SCNC: 3.8 MMOL/L — SIGNIFICANT CHANGE UP (ref 3.5–5.3)
POTASSIUM SERPL-SCNC: 4.4 MMOL/L — SIGNIFICANT CHANGE UP (ref 3.5–5.3)
PROT SERPL-MCNC: 6.6 G/DL — SIGNIFICANT CHANGE UP (ref 6–8.3)
PROT SERPL-MCNC: 7.8 G/DL — SIGNIFICANT CHANGE UP (ref 6–8.3)
PROT UR-MCNC: 100 MG/DL
RBC # BLD: 6.04 M/UL — HIGH (ref 4.2–5.8)
RBC # FLD: 12.8 % — SIGNIFICANT CHANGE UP (ref 10.3–14.5)
RBC CASTS # UR COMP ASSIST: 0 /HPF — SIGNIFICANT CHANGE UP (ref 0–4)
RSV RNA NPH QL NAA+NON-PROBE: SIGNIFICANT CHANGE UP
SARS-COV-2 RNA SPEC QL NAA+PROBE: SIGNIFICANT CHANGE UP
SODIUM SERPL-SCNC: 133 MMOL/L — LOW (ref 135–145)
SODIUM SERPL-SCNC: 136 MMOL/L — SIGNIFICANT CHANGE UP (ref 135–145)
SP GR SPEC: 1.02 — SIGNIFICANT CHANGE UP (ref 1–1.03)
SQUAMOUS # UR AUTO: 4 /HPF — SIGNIFICANT CHANGE UP (ref 0–5)
UROBILINOGEN FLD QL: 1 MG/DL — SIGNIFICANT CHANGE UP (ref 0.2–1)
WBC # BLD: 4.63 K/UL — SIGNIFICANT CHANGE UP (ref 3.8–10.5)
WBC # FLD AUTO: 4.63 K/UL — SIGNIFICANT CHANGE UP (ref 3.8–10.5)
WBC UR QL: 1 /HPF — SIGNIFICANT CHANGE UP (ref 0–5)

## 2025-01-26 RX ORDER — SODIUM CHLORIDE 9 MG/ML
2000 INJECTION, SOLUTION INTRAMUSCULAR; INTRAVENOUS; SUBCUTANEOUS ONCE
Refills: 0 | Status: COMPLETED | OUTPATIENT
Start: 2025-01-26 | End: 2025-01-26

## 2025-01-26 RX ORDER — ONDANSETRON 4 MG/1
4 TABLET ORAL ONCE
Refills: 0 | Status: COMPLETED | OUTPATIENT
Start: 2025-01-26 | End: 2025-01-26

## 2025-01-26 RX ORDER — ACETAMINOPHEN, DEXTROMETHORPHAN HYDROBROMIDE, GUAIFENESIN, AND PHENYLEPHRINE HYDROCHLORIDE 325; 10; 200; 5 MG/1; MG/1; MG/1; MG/1
2 CAPSULE, LIQUID FILLED ORAL
Qty: 20 | Refills: 0
Start: 2025-01-26 | End: 2025-01-30

## 2025-01-26 RX ADMIN — SODIUM CHLORIDE 2000 MILLILITER(S): 9 INJECTION, SOLUTION INTRAMUSCULAR; INTRAVENOUS; SUBCUTANEOUS at 00:35

## 2025-01-26 NOTE — ED ADULT NURSE NOTE - OBJECTIVE STATEMENT
Patient received in intake room 16. Patient is Aox4, ambulatory, able to speak in full sentences, c/o N/V and abdominal pain. Patient has a past medical history of DM. Patient c/o  N/V and abdominal pain after taking Theraflu. Patient states his whole family has the flu. Patient endorsing sore throat, congestion, headaches, and abdominal pain. 20G placed in LAC; labs drawn and sent. Patient medicated per chart. Patient endorsing chest pressure whenever he coughs; no phlegm noted. Patient pending lab results. Respirations even and unlabored, chest rise symmetrical b/l. Patient appears comfortable on stretcher in no signs of acute distress. Comfort measures maintained. Bed in lowest position. Safety maintained.

## 2025-01-26 NOTE — ED PROVIDER NOTE - PROGRESS NOTE DETAILS
HUE: I will sign out this patient pending labs and flu/COVID swab.  Patient is found to be flu a positive.  His labs indicated that he had slight DKA however patient has been tolerating p.o. denies any nausea vomiting only complains of feeling phlegmy.  At this time will provide fluids and repeat blood test. HUE: Patient's blood test returned.  His labs are improving.  No longer having in the anion gap.  Patient is aware of his findings has never been in DKA.  Tolerating p.o.  Is amenable to discharge home to follow-up outpatient with his doctor.  Return precautions explained and understood.

## 2025-01-26 NOTE — ED PROVIDER NOTE - CLINICAL SUMMARY MEDICAL DECISION MAKING FREE TEXT BOX
29-year-old male with past medical history of type 2 diabetes, hyperlipidemia, history of HSS, presenting with complaints of flulike symptoms for 4 to 5 days with associated nausea and vomiting.  Patient stopped eating for a few days to try to alleviate vomiting.  Tried eating again today and again vomiting.  States that his girlfriend tested positive for flu B.  Denies any urinary symptoms.  No diarrhea or constipation.  Patient with blood glucose 253.  States that he uses insulin at home.  ddx: gastroenteritis/viral syndrome, dka, lower suspicion for pancreatitis/biliary obstruction, abdomen benign low suspicion for acute abdominal process. Plan for DKA labs, fluids, flu/COVID/RSV swab, chest x-ray, reassess.

## 2025-01-26 NOTE — ED PROVIDER NOTE - PHYSICAL EXAMINATION
VITALS: reviewed  GEN: NAD, A & O x 4  HEAD/EYES: NCAT, EOMI, anicteric sclerae,   ENT: mucus membranes moist, oropharynx WNL, trachea midline,  RESP: lungs CTA with equal breath sounds bilaterally, chest wall nontender and atraumatic  CV: heart with reg rhythm S1, S2, distal pulses intact and symmetric bilaterally  ABDOMEN: soft, nondistended, genrealized abd ttp without rebound guarding, no palpable masses  : no CVAT  MSK: extremities atraumatic and nontender, no edema, no asymmetry.  SKIN: warm, dry, no rash, no bruising, no cyanosis. color appropriate for ethnicity  NEURO: alert, mentating appropriately, no facial asymmetry.   PSYCH: Affect appropriate

## 2025-01-26 NOTE — ED PROVIDER NOTE - NSFOLLOWUPINSTRUCTIONS_ED_ALL_ED_FT
You were seen in the emergency department tonight for flulike symptoms    You are found to be influenza A positive    This is the reason why you are feeling generalized weakness and coughing and congestion    The flu usually last 7 to 10 days    How to treat yourself at home despite getting plenty of rest drinking plenty of fluids to stay hydrated as well as taking your medications as prescribed    For the flu it is symptomatic care meaning that if you have symptoms you take medications to help with the symptoms such as if you have fevers or chills or bodyaches you take Tylenol or ibuprofen as prescribed by the  on the bottle    If you have congestion you can try over-the-counter medications for congestion such as Mucinex    Please be careful when taking medications as some of them such as Mucinex or NyQuil/DayQuil have a mix of medications so do not overdose on more than 1 medication    We recommend you follow-up with your primary care doctor for further recommendations within the next 2 to 3 days after discharge from the ED    If your symptoms start worsening such as you start having nausea, vomiting, diarrhea cannot eat or drink without throwing up or you have severe abdominal pain please come back to the emergency room for further evaluation at that time.    Attached to these discharge instructions are all the results from today's emergency department visit.  We recommend you take these to follow-up with your doctor when you do see them.

## 2025-01-27 LAB
CULTURE RESULTS: SIGNIFICANT CHANGE UP
SPECIMEN SOURCE: SIGNIFICANT CHANGE UP